# Patient Record
Sex: MALE | Race: WHITE | NOT HISPANIC OR LATINO | Employment: OTHER | URBAN - NONMETROPOLITAN AREA
[De-identification: names, ages, dates, MRNs, and addresses within clinical notes are randomized per-mention and may not be internally consistent; named-entity substitution may affect disease eponyms.]

---

## 2017-02-22 RX ORDER — SUCRALFATE ORAL 1 G/10ML
300 SUSPENSION ORAL DAILY PRN
COMMUNITY
End: 2018-05-24

## 2017-03-15 ENCOUNTER — ANESTHESIA EVENT (OUTPATIENT)
Dept: GASTROENTEROLOGY | Facility: HOSPITAL | Age: 62
End: 2017-03-15

## 2017-03-15 ENCOUNTER — HOSPITAL ENCOUNTER (OUTPATIENT)
Facility: HOSPITAL | Age: 62
Setting detail: HOSPITAL OUTPATIENT SURGERY
Discharge: HOME | End: 2017-03-15
Attending: INTERNAL MEDICINE | Admitting: INTERNAL MEDICINE

## 2017-03-15 ENCOUNTER — ANESTHESIA (OUTPATIENT)
Dept: GASTROENTEROLOGY | Facility: HOSPITAL | Age: 62
End: 2017-03-15

## 2017-03-15 VITALS
OXYGEN SATURATION: 94 % | SYSTOLIC BLOOD PRESSURE: 120 MMHG | RESPIRATION RATE: 19 BRPM | TEMPERATURE: 98.2 F | HEART RATE: 80 BPM | WEIGHT: 262 LBS | BODY MASS INDEX: 37.51 KG/M2 | DIASTOLIC BLOOD PRESSURE: 77 MMHG | HEIGHT: 70 IN

## 2017-03-15 DIAGNOSIS — K63.5 BENIGN COLON POLYP: ICD-10-CM

## 2017-03-15 DIAGNOSIS — K21.9 ACID REFLUX DISEASE WITH ULCER: ICD-10-CM

## 2017-03-15 PROCEDURE — 25010000002 PROPOFOL 10 MG/ML EMULSION: Performed by: NURSE ANESTHETIST, CERTIFIED REGISTERED

## 2017-03-15 PROCEDURE — 88305 TISSUE EXAM BY PATHOLOGIST: CPT | Performed by: INTERNAL MEDICINE

## 2017-03-15 PROCEDURE — 88305 TISSUE EXAM BY PATHOLOGIST: CPT | Performed by: PATHOLOGY

## 2017-03-15 RX ORDER — ONDANSETRON 2 MG/ML
4 INJECTION INTRAMUSCULAR; INTRAVENOUS ONCE AS NEEDED
Status: DISCONTINUED | OUTPATIENT
Start: 2017-03-15 | End: 2017-03-15 | Stop reason: HOSPADM

## 2017-03-15 RX ORDER — PROMETHAZINE HYDROCHLORIDE 25 MG/ML
12.5 INJECTION, SOLUTION INTRAMUSCULAR; INTRAVENOUS ONCE AS NEEDED
Status: DISCONTINUED | OUTPATIENT
Start: 2017-03-15 | End: 2017-03-15 | Stop reason: HOSPADM

## 2017-03-15 RX ORDER — DEXAMETHASONE SODIUM PHOSPHATE 4 MG/ML
8 INJECTION, SOLUTION INTRA-ARTICULAR; INTRALESIONAL; INTRAMUSCULAR; INTRAVENOUS; SOFT TISSUE ONCE AS NEEDED
Status: DISCONTINUED | OUTPATIENT
Start: 2017-03-15 | End: 2017-03-15 | Stop reason: HOSPADM

## 2017-03-15 RX ORDER — PROMETHAZINE HYDROCHLORIDE 25 MG/1
25 SUPPOSITORY RECTAL ONCE AS NEEDED
Status: DISCONTINUED | OUTPATIENT
Start: 2017-03-15 | End: 2017-03-15 | Stop reason: HOSPADM

## 2017-03-15 RX ORDER — PROMETHAZINE HYDROCHLORIDE 25 MG/1
25 TABLET ORAL ONCE AS NEEDED
Status: DISCONTINUED | OUTPATIENT
Start: 2017-03-15 | End: 2017-03-15 | Stop reason: HOSPADM

## 2017-03-15 RX ORDER — PROPOFOL 10 MG/ML
VIAL (ML) INTRAVENOUS AS NEEDED
Status: DISCONTINUED | OUTPATIENT
Start: 2017-03-15 | End: 2017-03-15 | Stop reason: SURG

## 2017-03-15 RX ORDER — DEXTROSE AND SODIUM CHLORIDE 5; .45 G/100ML; G/100ML
20 INJECTION, SOLUTION INTRAVENOUS CONTINUOUS
Status: DISCONTINUED | OUTPATIENT
Start: 2017-03-15 | End: 2017-03-15 | Stop reason: HOSPADM

## 2017-03-15 RX ADMIN — PROPOFOL 100 MG: 10 INJECTION, EMULSION INTRAVENOUS at 14:20

## 2017-03-15 RX ADMIN — PROPOFOL 30 MG: 10 INJECTION, EMULSION INTRAVENOUS at 14:28

## 2017-03-15 RX ADMIN — PROPOFOL 20 MG: 10 INJECTION, EMULSION INTRAVENOUS at 14:25

## 2017-03-15 RX ADMIN — PROPOFOL 30 MG: 10 INJECTION, EMULSION INTRAVENOUS at 14:23

## 2017-03-15 RX ADMIN — PROPOFOL 20 MG: 10 INJECTION, EMULSION INTRAVENOUS at 14:24

## 2017-03-15 RX ADMIN — PROPOFOL 30 MG: 10 INJECTION, EMULSION INTRAVENOUS at 14:21

## 2017-03-15 RX ADMIN — DEXTROSE AND SODIUM CHLORIDE 20 ML/HR: 5; 450 INJECTION, SOLUTION INTRAVENOUS at 14:12

## 2017-03-15 NOTE — PLAN OF CARE
Problem: Patient Care Overview (Adult)  Goal: Plan of Care Review  Outcome: Ongoing (interventions implemented as appropriate)    03/15/17 1433   Coping/Psychosocial Response Interventions   Plan Of Care Reviewed With patient   Patient Care Overview   Progress no change   Outcome Evaluation   Outcome Summary/Follow up Plan vss         Problem: GI Endoscopy (Adult)  Goal: Signs and Symptoms of Listed Potential Problems Will be Absent or Manageable (GI Endoscopy)  Outcome: Ongoing (interventions implemented as appropriate)    03/15/17 1433   GI Endoscopy   Problems Assessed (GI Endoscopy) all   Problems Present (GI Endoscopy) none

## 2017-03-15 NOTE — H&P
Rosalinda Dawson DO,[facility]  Gastroenterology  Hepatology  Endoscopy  Board Certified in Internal Medicine and gastroenterology  [address], [address]  [place]. [address]  - [phone]   F [phone]     GASTROENTEROLOGY HISTORY AND PHYSICAL  NOTE   ROSALINDA DAWSON DO.         SUBJECTIVE:   3/15/2017    Name: Josh Pérez  DOD: 1955        Chief Complaint:       Subjective : Personal history of colon polyps    Patient is 62 y.o. male presents with desire is for surveillance colonoscopy.  No objective or subjective symptoms.  No family history of colon cancer.      ROS/HISTORY/ CURRENT MEDICATIONS/OBJECTIVE/VS/PE:   Review of Systems:   Review of Systems    History:   History reviewed. No pertinent past medical history.  History reviewed. No pertinent past surgical history.  Family History   Problem Relation Age of Onset   • Hypertension Mother    • COPD Mother    • Diabetes Mother    • Heart disease Mother    • Hypertension Father    • COPD Father    • Diabetes Father    • Heart disease Father      Social History   Substance Use Topics   • Smoking status: Never Smoker   • Smokeless tobacco: None   • Alcohol use No     Prescriptions Prior to Admission   Medication Sig Dispense Refill Last Dose   • guaiFENesin (ROBITUSSIN) 100 MG/5ML syrup Take 200 mg by mouth 2 (Two) Times a Day As Needed for cough.   3/14/2017 at Unknown time   • sucralfate (CARAFATE) 1 GM/10ML suspension Take 300 mg by mouth Daily As Needed.   Past Month at Unknown time     Allergies:  Penicillins    I have reviewed the patients medical history, surgical history and family history in the available medical record system.     Current Medications:     Current Facility-Administered Medications   Medication Dose Route Frequency Provider Last Rate Last Dose   • dexamethasone (DECADRON) injection 8 mg  8 mg Intravenous Once PRN Ralph Torrez CRNA       • dextrose 5 % and sodium chloride 0.45 % infusion  20 mL/hr  Intravenous Continuous Joseph Bustos, DO       • meperidine (DEMEROL) injection 12.5 mg  12.5 mg Intravenous Q5 Min PRN Ralph Torrez CRNA       • ondansetron (ZOFRAN) injection 4 mg  4 mg Intravenous Once PRN Ralph Torrez CRNA       • promethazine (PHENERGAN) injection 12.5 mg  12.5 mg Intravenous Once PRN Ralph Torrez CRNA        Or   • promethazine (PHENERGAN) injection 12.5 mg  12.5 mg Intramuscular Once PRN Ralph Torrez CRNA        Or   • promethazine (PHENERGAN) suppository 25 mg  25 mg Rectal Once PRN Ralph Torrez CRNA        Or   • promethazine (PHENERGAN) tablet 25 mg  25 mg Oral Once PRN Ralph Torrez CRNA           Objective     Physical Exam:   Temp:  [97.7 °F (36.5 °C)] 97.7 °F (36.5 °C)  Heart Rate:  [84] 84  Resp:  [20] 20  BP: (159)/(88) 159/88    Physical Exam:  General Appearance:    Alert, cooperative, in no acute distress   Head:    Normocephalic, without obvious abnormality, atraumatic   Eyes:            Lids and lashes normal, conjunctivae and sclerae normal, no   icterus, no pallor, corneas clear, PERRLA   Ears:    Ears appear intact with no abnormalities noted   Throat:   No oral lesions, no thrush, oral mucosa moist   Neck:   No adenopathy, supple, trachea midline, no thyromegaly, no     carotid bruit, no JVD   Back:     No kyphosis present, no scoliosis present, no skin lesions,       erythema or scars, no tenderness to percussion or                   palpation,   range of motion normal   Lungs:     Clear to auscultation,respirations regular, even and                   unlabored    Heart:    Regular rhythm and normal rate, normal S1 and S2, no            murmur, no gallop, no rub, no click   Breast Exam:    Deferred   Abdomen:     Normal bowel sounds, no masses, no organomegaly, soft        non-tender, non-distended, no guarding, no rebound                 tenderness   Genitalia:    Deferred   Extremities:   Moves all extremities well,  no edema, no cyanosis, no              redness   Pulses:   Pulses palpable and equal bilaterally   Skin:   No bleeding, bruising or rash   Lymph nodes:   No palpable adenopathy   Neurologic:   Cranial nerves 2 - 12 grossly intact, sensation intact, DTR        present and equal bilaterally      Results Review:     No results found for: WBC, HGB, HCT, PLT          No results found for: LIPASE  No results found for: INR       Radiology Review:  Imaging Results (last 72 hours)     ** No results found for the last 72 hours. **           I reviewed the patient's new clinical results.  I reviewed the patient's new imaging results and agree with the interpretation.     ASSESSMENT/PLAN:   ASSESSMENT:   1.  Personal history of colon polyps    PLAN:   1.  Colonoscopy, surveillance    Risk and benefits associated with the procedure are reviewed with the patient.  He wishes to proceed      Joseph Bustos DO  03/15/17  2:08 PM

## 2017-03-15 NOTE — DISCHARGE INSTRUCTIONS
Dr. Joseph Bustos, DO  [address]  [place] [address]  [phone]    Appointment date and time: as needed            Outpatient Instructions for Monitored Anesthesia Care (MAC)    1. You will be released from the hospital in the care of a responsible adult who should remain with you for at least 6 hours.    2. You are at an increased risk for falls following anesthesia. Use care when changing from a lying to a sitting position. Use your assistive devices ( example: cane, walker or family member).    3. You must NOT drive a car, climb high places such as a ladder, or operate equipment such as electric knives,stoves etc...for at least 12 hours. If you are dizzy for longer than 24 hours, notify your doctor.    4. DO NOT drink any alcoholic beverages for at least 24 hours. Anesthesia may impair your judgement.    5. If you smoke, do not smoke alone due to increased risk of burns/fires.    6. DO NOT undertake any legally binding commitment for at least 24 hours. Anesthesia may impair your judgement.

## 2017-03-15 NOTE — NURSING NOTE
Pt voices that he initially agreed to EGD & Colonoscopy during visit with Dr. Audra Pepe.  However, he since had decided to not have the EGD.  Dr. Bustos in to speak with patient & aware of the pt's decision.

## 2017-03-15 NOTE — ANESTHESIA POSTPROCEDURE EVALUATION
Patient: Josh Pérez    Procedure Summary     Date Anesthesia Start Anesthesia Stop Room / Location    03/15/17 9877 3 [facility] ENDOSCOPY 2 / [facility] ENDOSCOPY       Procedure Diagnosis Surgeon Provider    COLONOSCOPY (N/A ) Benign colon polyp; Acid reflux disease with ulcer  (COLON POLYP) Joseph Bustos, DO Ralph Torrez, CRNA          Anesthesia Type: MAC  Last vitals  /88 (03/15/17 1347)    Temp 97.7 °F (36.5 °C) (03/15/17 1347)    Pulse 84 (03/15/17 1347)   Resp 20 (03/15/17 1347)    SpO2 96 % (03/15/17 1347)      Post Anesthesia Care and Evaluation    Patient location during evaluation: bedside  Patient participation: complete - patient participated  Level of consciousness: awake and alert  Pain management: adequate  Airway patency: patent  Anesthetic complications: No anesthetic complications  PONV Status: none  Cardiovascular status: acceptable  Respiratory status: acceptable  Hydration status: acceptable

## 2017-03-15 NOTE — ANESTHESIA PREPROCEDURE EVALUATION
Anesthesia Evaluation        Airway   Mallampati: III  TM distance: >3 FB  no difficulty expected  Dental    (+) upper dentures    Pulmonary - negative pulmonary ROS and normal exam   Cardiovascular - negative cardio ROS and normal exam        Neuro/Psych- negative ROS  GI/Hepatic/Renal/Endo    (+) morbid obesity,     Musculoskeletal (-) negative ROS    Abdominal    Substance History      OB/GYN negative ob/gyn ROS         Other                                    Anesthesia Plan    ASA 2     MAC     intravenous induction   Anesthetic plan and risks discussed with patient.

## 2017-03-17 LAB
LAB AP CASE REPORT: NORMAL
Lab: NORMAL
PATH REPORT.FINAL DX SPEC: NORMAL
PATH REPORT.GROSS SPEC: NORMAL

## 2018-05-09 ENCOUNTER — TRANSCRIBE ORDERS (OUTPATIENT)
Dept: ORTHOPEDIC SURGERY | Facility: CLINIC | Age: 63
End: 2018-05-09

## 2018-05-09 DIAGNOSIS — M25.562 CHRONIC PAIN OF LEFT KNEE: Primary | ICD-10-CM

## 2018-05-09 DIAGNOSIS — G89.29 CHRONIC PAIN OF LEFT KNEE: Primary | ICD-10-CM

## 2018-05-23 DIAGNOSIS — M25.562 LEFT KNEE PAIN, UNSPECIFIED CHRONICITY: Primary | ICD-10-CM

## 2018-05-24 ENCOUNTER — OFFICE VISIT (OUTPATIENT)
Dept: ORTHOPEDIC SURGERY | Facility: CLINIC | Age: 63
End: 2018-05-24

## 2018-05-24 VITALS — WEIGHT: 265.4 LBS | HEIGHT: 70 IN | BODY MASS INDEX: 37.99 KG/M2

## 2018-05-24 DIAGNOSIS — S83.242A TEAR OF MEDIAL MENISCUS OF LEFT KNEE, CURRENT, INITIAL ENCOUNTER: ICD-10-CM

## 2018-05-24 DIAGNOSIS — M25.562 LEFT KNEE PAIN, UNSPECIFIED CHRONICITY: Primary | ICD-10-CM

## 2018-05-24 PROCEDURE — 99203 OFFICE O/P NEW LOW 30 MIN: CPT | Performed by: ORTHOPAEDIC SURGERY

## 2018-05-24 PROCEDURE — 20610 DRAIN/INJ JOINT/BURSA W/O US: CPT | Performed by: ORTHOPAEDIC SURGERY

## 2018-05-24 RX ADMIN — LIDOCAINE HYDROCHLORIDE 2 ML: 10 INJECTION, SOLUTION INFILTRATION; PERINEURAL at 15:44

## 2018-05-24 RX ADMIN — TRIAMCINOLONE ACETONIDE 40 MG: 40 INJECTION, SUSPENSION INTRA-ARTICULAR; INTRAMUSCULAR at 15:44

## 2018-05-24 NOTE — PROGRESS NOTES
Josh Pérez is a 63 y.o. male   Primary provider:  Antoinette Pepe MD       Chief Complaint   Patient presents with   • Left Knee - Pain       HISTORY OF PRESENT ILLNESS: Patient being seen for left knee pain. MRI done at [facility] 4/30/2018. Patient states he has difficulty walking on left leg. Pain increases at night, disrupting sleep.   Pain on the inside of left knee.  Did do some PT but it made his pain worse.  Occasional sharp stabbing pain that has been worsening for about 7-8 months.  No specific injury  Pain with pivot and twist  No numbness or tingling that is new  Does have swelling in both knees.  Pain with deep knee bends.    Pain   The current episode started more than 1 month ago (8 months). Associated symptoms include joint swelling and numbness. Associated symptoms comments: Aching, burning, swelling. The symptoms are aggravated by standing and walking. He has tried rest for the symptoms.        CONCURRENT MEDICAL HISTORY:    History reviewed. No pertinent past medical history.    Allergies   Allergen Reactions   • Penicillins Rash         Current Outpatient Prescriptions:   •  guaiFENesin (ROBITUSSIN) 100 MG/5ML syrup, Take 200 mg by mouth 2 (Two) Times a Day As Needed for cough., Disp: , Rfl:   •  Naproxen Sodium (ALEVE PO), Take  by mouth., Disp: , Rfl:     Past Surgical History:   Procedure Laterality Date   • COLONOSCOPY N/A 3/15/2017    Procedure: COLONOSCOPY;  Surgeon: Joseph Bustos DO;  Location: [facility] ENDOSCOPY;  Service:        Family History   Problem Relation Age of Onset   • Hypertension Mother    • COPD Mother    • Diabetes Mother    • Heart disease Mother    • Hypertension Father    • COPD Father    • Diabetes Father    • Heart disease Father        Social History     Social History   • Marital status:      Spouse name: N/A   • Number of children: N/A   • Years of education: N/A     Occupational History   • Not on file.     Social History Main Topics   •  "Smoking status: Never Smoker   • Smokeless tobacco: Never Used   • Alcohol use No   • Drug use: No   • Sexual activity: Not on file     Other Topics Concern   • Not on file     Social History Narrative   • No narrative on file        Review of Systems   HENT: Positive for postnasal drip.    Genitourinary: Positive for difficulty urinating.   Musculoskeletal: Positive for joint swelling.        Stiffness   Neurological: Positive for numbness.   All other systems reviewed and are negative.      PHYSICAL EXAMINATION:       Ht 177.8 cm (70\")   Wt 120 kg (265 lb 6.4 oz)   BMI 38.08 kg/m²     Physical Exam   Constitutional: He is oriented to person, place, and time. He appears well-developed and well-nourished.   Musculoskeletal:        Left knee: He exhibits no effusion.   Neurological: He is alert and oriented to person, place, and time.   Psychiatric: He has a normal mood and affect. His behavior is normal. Judgment and thought content normal.       GAIT:     [x]  Normal  []  Antalgic    Assistive device: [x]  None  []  Walker     []  Crutches  []  Cane     []  Wheelchair  []  Stretcher    Right Knee Exam     Tenderness   The patient is experiencing no tenderness.         Range of Motion   The patient has normal right knee ROM.    Muscle Strength     The patient has normal right knee strength.    Tests   Drawer:       Anterior - negative      Varus: negative  Valgus: negative    Other   Erythema: absent  Sensation: normal  Pulse: present  Swelling: none      Left Knee Exam     Tenderness   The patient is experiencing tenderness in the medial joint line and medial retinaculum.    Muscle Strength     The patient has normal left knee strength.    Tests   Drawer:       Anterior - negative       Varus: negative  Valgus: negative    Other   Erythema: absent  Sensation: normal  Pulse: present  Swelling: none  Effusion: no effusion present              Large Joint Arthrocentesis  Date/Time: 5/24/2018 3:44 PM  Consent given " by: patient  Site marked: site marked  Timeout: Immediately prior to procedure a time out was called to verify the correct patient, procedure, equipment, support staff and site/side marked as required   Supporting Documentation  Indications: joint swelling   Procedure Details  Location: knee - L knee  Preparation: Patient was prepped and draped in the usual sterile fashion  Needle size: 22 G  Approach: anteromedial  Medications administered: 40 mg triamcinolone acetonide 40 MG/ML; 2 mL lidocaine 1 %              MRI Left Knee [facility]  4/30/2018            ASSESSMENT:    Diagnoses and all orders for this visit:    Left knee pain, unspecified chronicity  -     Large Joint Arthrocentesis    Tear of medial meniscus of left knee, current, initial encounter    Other orders  -     Naproxen Sodium (ALEVE PO); Take  by mouth.          PLAN    Patient did not bring xrays today.  We discussed injection today.  Will see how he responds to injection  Possible surgery depending on how his sympoms change with the injection.  Plan AP bilateral standing with laterals of each knee.  Activity as tolerated.      Patient's Body mass index is 38.08 kg/m². BMI is above normal parameters. Recommendations include: exercise counseling and nutrition counseling.    Return in about 6 weeks (around 7/5/2018) for Recheck with repeat xrays.      Joseph Eddy MD

## 2018-05-27 RX ORDER — TRIAMCINOLONE ACETONIDE 40 MG/ML
40 INJECTION, SUSPENSION INTRA-ARTICULAR; INTRAMUSCULAR
Status: COMPLETED | OUTPATIENT
Start: 2018-05-24 | End: 2018-05-24

## 2018-05-27 RX ORDER — LIDOCAINE HYDROCHLORIDE 10 MG/ML
2 INJECTION, SOLUTION INFILTRATION; PERINEURAL
Status: COMPLETED | OUTPATIENT
Start: 2018-05-24 | End: 2018-05-24

## 2018-07-11 DIAGNOSIS — M25.562 PAIN IN BOTH KNEES, UNSPECIFIED CHRONICITY: Primary | ICD-10-CM

## 2018-07-11 DIAGNOSIS — M25.561 PAIN IN BOTH KNEES, UNSPECIFIED CHRONICITY: Primary | ICD-10-CM

## 2018-07-12 ENCOUNTER — OFFICE VISIT (OUTPATIENT)
Dept: ORTHOPEDIC SURGERY | Facility: CLINIC | Age: 63
End: 2018-07-12

## 2018-07-12 VITALS — WEIGHT: 256 LBS | HEIGHT: 70 IN | BODY MASS INDEX: 36.65 KG/M2

## 2018-07-12 DIAGNOSIS — S83.242A TEAR OF MEDIAL MENISCUS OF LEFT KNEE, CURRENT, INITIAL ENCOUNTER: ICD-10-CM

## 2018-07-12 DIAGNOSIS — M79.89 LEFT LEG SWELLING: ICD-10-CM

## 2018-07-12 DIAGNOSIS — M25.562 LEFT KNEE PAIN, UNSPECIFIED CHRONICITY: Primary | ICD-10-CM

## 2018-07-12 PROCEDURE — 99213 OFFICE O/P EST LOW 20 MIN: CPT | Performed by: ORTHOPAEDIC SURGERY

## 2018-07-12 NOTE — PROGRESS NOTES
"Josh Pérez is a 63 y.o. male returns for     Chief Complaint   Patient presents with   • Left Knee - Follow-up       HISTORY OF PRESENT ILLNESS: Patient being seen for left knee follow up. X-rays done today. Patient has noticed decrease in pain in left knee. He also states he is experiencing pain in right hip.   He is doing some better.  He still has pain in the medial aspect of his left knee.  He is also hurting on the lateral aspect of his right hip.  Mostly it's dull aches but he does have occasional sharp pains.       CONCURRENT MEDICAL HISTORY:    History reviewed. No pertinent past medical history.    Allergies   Allergen Reactions   • Penicillins Rash         Current Outpatient Prescriptions:   •  guaiFENesin (ROBITUSSIN) 100 MG/5ML syrup, Take 200 mg by mouth 2 (Two) Times a Day As Needed for cough., Disp: , Rfl:   •  apixaban (ELIQUIS) 5 MG tablet tablet, Take 2 tablets by mouth Every 12 (Twelve) Hours for 7 days., Disp: 28 tablet, Rfl: 0  •  apixaban (ELIQUIS) 5 MG tablet tablet, Take 1 tablet by mouth Every 12 (Twelve) Hours for 35 days., Disp: 70 tablet, Rfl: 0    Past Surgical History:   Procedure Laterality Date   • COLONOSCOPY N/A 3/15/2017    Procedure: COLONOSCOPY;  Surgeon: Joseph Bustos DO;  Location: [facility] ENDOSCOPY;  Service:        ROS  No fevers or chills.  No chest pain or shortness of air.  No GI or  disturbances.    PHYSICAL EXAMINATION:       Ht 177.8 cm (70\")   Wt 116 kg (256 lb)   BMI 36.73 kg/m²     Physical Exam   Constitutional: He is oriented to person, place, and time. He appears well-developed and well-nourished.   Musculoskeletal:        Left knee: He exhibits no effusion.   Neurological: He is alert and oriented to person, place, and time.   Psychiatric: He has a normal mood and affect. His behavior is normal. Judgment and thought content normal.       GAIT:     [x]  Normal  []  Antalgic    Assistive device: [x]  None  []  Walker     []  Crutches  []  Cane     []  " Wheelchair  []  Stretcher    Left Knee Exam     Tenderness   The patient is experiencing tenderness in the medial joint line and medial retinaculum.    Muscle Strength     The patient has normal left knee strength.    Tests   Drawer:       Anterior - negative       Varus: negative  Valgus: negative    Other   Erythema: absent  Sensation: normal  Pulse: present  Swelling: none  Effusion: no effusion present    Comments:  He has mild diffuse swelling in the left lower extremity and he is tender mildly in the calf and mildly posterior to his knee as well.              Xr Knee Bilateral Ap Standing    Result Date: 7/13/2018  Narrative: Ordering Provider:  Joseph Eddy MD Ordering Diagnosis/Indication:  Pain in both knees, unspecified chronicity Procedure:  XR KNEE BILATERAL AP STANDING Exam Date:  7/12/18 RELEVANT PRIOR IMAGES:  None COMPARISON:  Not applicable, no relevant images available.     Impression:   AP bilateral standing with lateral of each knee shows acceptable position and alignment of both knees with mild varus positioning.  There is maintenance of joint space in both knees.  No evidence of acute bony abnormality is noted.  No significant patellofemoral joint arthritic changes noted. Joseph Eddy MD 7/12/18     Xr Knee 1 Or 2 View Bilateral    Result Date: 7/13/2018  Narrative: Ordering Provider:  Joseph Eddy MD Ordering Diagnosis/Indication:  Pain in both knees, unspecified chronicity Procedure:  XR KNEE 1 OR 2 VW BILATERAL Exam Date:  7/12/18 RELEVANT PRIOR IMAGES:  None COMPARISON:  Not applicable, no relevant images available.     Impression:  AP bilateral standing with lateral of each knee shows acceptable position and alignment of both knees with mild varus positioning.  There is maintenance of joint space in both knees.  No evidence of acute bony abnormality is noted.  No significant patellofemoral joint arthritic changes noted. Joseph Eddy MD 7/12/18     Us  Venous Doppler Lower Extremity Left (duplex)    Result Date: 7/13/2018  Narrative: EXAM DESCRIPTION: US VENOUS DOPPLER LOWER EXTREMITY LEFT (DUPLEX) PROCEDURE: Left Lower Extremity Venous Duplex COMPARISON: None HISTORY: Left lower extremity pain and swelling FINDINGS: Realtime grayscale and color-flow imaging were performed of the left lower extremity. The left common femoral vein, profunda femoral vein, and femoral vein have normal venous flow on duplex and color Doppler. The veins augment well compress normally, and appear to be patent with no filling defects visible on color Doppler or grayscale imaging. There is nonocclusive peripheral thrombus seen within the popliteal vein. This may be chronic. No central or occlusive thrombus identified. The visualized infrapopliteal veins also appear patent with no evidence of thrombus. The greater saphenous vein is patent.     Impression: There is nonocclusive peripheral thrombus seen within the popliteal vein. Findings reported to Ana Maria GROSSMAN in the office of Dr. Joseph Eddy 1:30 PM CST 7/13/2018. Electronically signed by:  Speedy Carlton MD  7/13/2018 1:36 PM CDT Workstation: [id number]            ASSESSMENT:    Diagnoses and all orders for this visit:    Left knee pain, unspecified chronicity  -     US Venous Doppler Lower Extremity Left (duplex); Future    Tear of medial meniscus of left knee, current, initial encounter  -     US Venous Doppler Lower Extremity Left (duplex); Future    Left leg swelling  -     US Venous Doppler Lower Extremity Left (duplex); Future    Other orders  -     apixaban (ELIQUIS) 5 MG tablet tablet; Take 2 tablets by mouth Every 12 (Twelve) Hours for 7 days.  -     apixaban (ELIQUIS) 5 MG tablet tablet; Take 1 tablet by mouth Every 12 (Twelve) Hours for 35 days.          PLAN    Plan is for him to proceed with a ultrasound of the left leg to assess for the possibility of a DVT.  Otherwise we discussed the possibility of a repeat injection in the  left knee.  We also discussed possibility of a trochanteric injection into the right hip.  He was encouraged using a cane in his right hand to offset the weight on his left leg to help localization without a limp.    Patient's Body mass index is 36.73 kg/m². BMI is above normal parameters. Recommendations include: exercise counseling and nutrition counseling.      Report was reviewed and DVT noted in popliteal vein.  Will start Eliquis.  Patient was notified at 1925 on Friday night.  He will  the medication in the morning.  We will also work on getting him set up to see vascular for management and screening risk for further DVT.    Return in about 4 weeks (around 8/9/2018).    Joseph Eddy MD

## 2018-07-13 ENCOUNTER — HOSPITAL ENCOUNTER (OUTPATIENT)
Dept: ULTRASOUND IMAGING | Facility: HOSPITAL | Age: 63
Discharge: HOME | End: 2018-07-13
Admitting: ORTHOPAEDIC SURGERY

## 2018-07-13 ENCOUNTER — TELEPHONE (OUTPATIENT)
Dept: ORTHOPEDIC SURGERY | Facility: CLINIC | Age: 63
End: 2018-07-13

## 2018-07-13 DIAGNOSIS — M79.89 LEFT LEG SWELLING: ICD-10-CM

## 2018-07-13 DIAGNOSIS — S83.242A TEAR OF MEDIAL MENISCUS OF LEFT KNEE, CURRENT, INITIAL ENCOUNTER: ICD-10-CM

## 2018-07-13 DIAGNOSIS — M25.562 LEFT KNEE PAIN, UNSPECIFIED CHRONICITY: ICD-10-CM

## 2018-07-13 PROCEDURE — 93971 EXTREMITY STUDY: CPT

## 2018-07-13 NOTE — TELEPHONE ENCOUNTER
JUST HAD HIS ULTRA SOUND DONE, THEY SEEN  SOMETHING ON IT AND TOLD HIM TO CALL THE OFFICE AND HAVE YOU LOOK AT IT.   DD

## 2018-08-14 ENCOUNTER — OFFICE VISIT (OUTPATIENT)
Dept: CARDIAC SURGERY | Facility: CLINIC | Age: 63
End: 2018-08-14

## 2018-08-14 VITALS
BODY MASS INDEX: 36.54 KG/M2 | HEIGHT: 71 IN | HEART RATE: 89 BPM | OXYGEN SATURATION: 99 % | DIASTOLIC BLOOD PRESSURE: 80 MMHG | WEIGHT: 261 LBS | SYSTOLIC BLOOD PRESSURE: 140 MMHG

## 2018-08-14 DIAGNOSIS — I82.432 ACUTE DEEP VEIN THROMBOSIS (DVT) OF POPLITEAL VEIN OF LEFT LOWER EXTREMITY (HCC): ICD-10-CM

## 2018-08-14 DIAGNOSIS — M79.89 LEFT LEG SWELLING: Primary | ICD-10-CM

## 2018-08-14 DIAGNOSIS — Z79.01 CURRENT USE OF LONG TERM ANTICOAGULATION: ICD-10-CM

## 2018-08-14 DIAGNOSIS — R00.2 HEART PALPITATIONS: ICD-10-CM

## 2018-08-14 DIAGNOSIS — R06.02 SHORTNESS OF BREATH: ICD-10-CM

## 2018-08-14 PROBLEM — I82.439 ACUTE DEEP VEIN THROMBOSIS (DVT) OF POPLITEAL VEIN: Status: ACTIVE | Noted: 2018-08-14

## 2018-08-14 PROCEDURE — 99204 OFFICE O/P NEW MOD 45 MIN: CPT | Performed by: NURSE PRACTITIONER

## 2018-08-14 NOTE — PATIENT INSTRUCTIONS
Probable Acute DVT LEFT lower extremity-RESOLVED  Long term anticoagulation: Eliquis x 3 mos   Compression stockings for swelling/discomfort  If you should experience any additional leg pain with swelling or sudden onset of shortness of breath notify heart and vascular center immediately for evaluation.    Obtain Partial-Hypercoagulable Panel  Office to call abnormal results (approx 2 weeks)  Return 3mos- Re-Eval    Palpitations/SOA  Strong family History Heart disease  -Obtain ECHO  -Cardiology Referral for risk factor modification evaluation

## 2018-08-22 NOTE — PROGRESS NOTES
Subjective   Patient ID: Josh Pérez is a 63 y.o. male is being seen for consultation today at the request of Joseph Eddy, *    Chief Complaint:    Chief Complaint   Patient presents with   • Leg Swelling       The following portions of the patient's history were reviewed and updated as appropriate: allergies, current medications, past family history, past medical history, past social history, past surgical history and problem list.  Recent images independently reviewed.  Available laboratory values reviewed.    PCP:  Antoinette Pepe MD    63 y.o. male with complaints of LLE Swelling x 8-10 months, conservative treatment, diuretics. Evaluated by orthopedics for LEFT knee pain. Further workup demonstrated +thrombus LEFT popliteal vein. Anticoagulation was initiated Eliquis acute dosing. Referred for vascular follow up. Strong family history heart disease (death ages 62,66). Reports dyspnea on exertion, occasional palpitation.  No TIA stroke amaurosis.  No MI claudication. No other associated signs, symptoms or modifying factors.    7/13/18: LLE Venous Duplex (BHM): +Nonocclusive thrombus popliteal vein  8/14/18: LLE Venous Duplex (HVC): Negative DVT      No past medical history on file.  Past Surgical History:   Procedure Laterality Date   • COLONOSCOPY N/A 3/15/2017    Procedure: COLONOSCOPY;  Surgeon: Joseph Bustos DO;  Location: [facility] ENDOSCOPY;  Service:        ALLERGIES:   Penicillins    MEDICATIONS:  Current outpatient and discharge medications have been reconciled for the patient.  Reviewed by: HELEN Meza     Current Outpatient Prescriptions:   •  apixaban (ELIQUIS) 5 MG tablet tablet, Take 1 tablet by mouth Every 12 (Twelve) Hours., Disp: 60 tablet, Rfl: 1  •  Sildenafil Citrate (VIAGRA PO), Take  by mouth., Disp: , Rfl:   •  guaiFENesin (ROBITUSSIN) 100 MG/5ML syrup, Take 200 mg by mouth 2 (Two) Times a Day As Needed for cough., Disp: , Rfl:     Review of Systems    Constitution: Negative for weakness.   HENT: Negative for nosebleeds.    Eyes: Negative for visual disturbance.   Cardiovascular: Positive for leg swelling (L<R) and palpitations. Negative for claudication and cyanosis.   Respiratory: Positive for shortness of breath. Negative for hemoptysis.    Hematologic/Lymphatic: Negative for bleeding problem. Does not bruise/bleed easily.   Skin: Negative for color change and nail changes.   Musculoskeletal: Positive for joint pain. Negative for muscle weakness.   Gastrointestinal: Negative for dysphagia, hematemesis and melena.   Genitourinary: Negative for hematuria.   Neurological: Negative for dizziness, focal weakness, light-headedness, loss of balance, numbness and paresthesias.   Psychiatric/Behavioral: Negative for altered mental status.   All other systems reviewed and are negative.       Objective       Vitals:    08/14/18 1406   BP: 140/80   Pulse:    SpO2:       Body mass index is 36.4 kg/m².  Physical Exam   Constitutional: He is oriented to person, place, and time. He appears well-developed.   HENT:   Head: Normocephalic.   Eyes: EOM are normal.   Neck: Neck supple. Carotid bruit is not present.   Cardiovascular: Normal rate, regular rhythm and normal heart sounds.    Pulses:       Dorsalis pedis pulses are 2+ on the right side, and 2+ on the left side.        Posterior tibial pulses are 2+ on the right side, and 2+ on the left side.   Pulmonary/Chest: Effort normal and breath sounds normal.   Abdominal: Soft. Bowel sounds are normal.   Musculoskeletal: Normal range of motion. He exhibits edema (mild LLE).   Gait normal   Neurological: He is alert and oriented to person, place, and time. No cranial nerve deficit.   Skin: Skin is warm and dry. Capillary refill takes less than 2 seconds.   Psychiatric: He has a normal mood and affect. Thought content normal.   Vitals reviewed.      Assessment/Plan   Independent Review of Radiographic Studies:  LE VENOUS  DUPLEX  Detailed discussion regarding risks, benefits, and treatment plan. Images independently reviewed. Patient understands, agrees, and wishes to proceed with plan.     1. Left leg swelling  Probable Acute DVT LEFT lower extremity-RESOLVED  Compression stockings for swelling/discomfort  If you should experience any additional leg pain with swelling or sudden onset of shortness of breath notify heart and vascular center immediately for evaluation.    Obtain Partial-Hypercoagulable Panel  Office to call abnormal results (approx 2 weeks)  Return 3mos- Re-Eval  - Duplex Venous Lower Extremity - Left CAR; Future    2. Acute deep vein thrombosis (DVT) of popliteal vein of left lower extremity (CMS/HCC)  Long term anticoagulation: Eliquis x 3 mos   - apixaban (ELIQUIS) 5 MG tablet tablet; Take 1 tablet by mouth Every 12 (Twelve) Hours.  Dispense: 60 tablet; Refill: 1  - Factor II, DNA Analysis; Future  - Factor 5 [place]; Future  - Homocysteine, serum; Future    3. Current use of long term anticoagulation  Anticoagulation teaching/discussion for 30 minutes of direct face-face interaction with the patient during this encounter and over half of that time was spent on counseling and coordination of care.  We discussed in depth the importance of medication adherence, signs/symptoms of bleeding, and management of anticoagulation for procedures. I also educated the patient about assistance programs available for cost reduction of the medication prescribed.      4. Heart palpitations  Palpitations/SOA  Strong family History Heart disease  -Obtain ECHO  -Cardiology Referral for risk factor modification evaluation  - Adult Transthoracic Echo Limited W/ Cont if Necessary Per Protocol; Future    5. Shortness of breath  - Adult Transthoracic Echo Limited W/ Cont if Necessary Per Protocol; Future            This document has been electronically signed by HELEN Meza on August 22, 2018 5:08 PM

## 2018-08-23 ENCOUNTER — LAB (OUTPATIENT)
Dept: LAB | Facility: HOSPITAL | Age: 63
End: 2018-08-23

## 2018-08-23 DIAGNOSIS — I82.432 ACUTE DEEP VEIN THROMBOSIS (DVT) OF POPLITEAL VEIN OF LEFT LOWER EXTREMITY (HCC): ICD-10-CM

## 2018-08-23 PROCEDURE — 83090 ASSAY OF HOMOCYSTEINE: CPT

## 2018-08-23 PROCEDURE — 81240 F2 GENE: CPT

## 2018-08-23 PROCEDURE — 36415 COLL VENOUS BLD VENIPUNCTURE: CPT

## 2018-08-23 PROCEDURE — 81241 F5 GENE: CPT

## 2018-08-24 LAB — HCYS SERPL-SCNC: 12 UMOL/L (ref 0–15)

## 2018-08-27 LAB
F2 GENE MUT ANL BLD/T: NORMAL
F5 GENE MUT ANL BLD/T: NORMAL

## 2018-10-22 ENCOUNTER — OFFICE VISIT (OUTPATIENT)
Dept: CARDIOLOGY | Facility: CLINIC | Age: 63
End: 2018-10-22

## 2018-10-22 VITALS
HEART RATE: 90 BPM | OXYGEN SATURATION: 95 % | DIASTOLIC BLOOD PRESSURE: 80 MMHG | BODY MASS INDEX: 36.82 KG/M2 | WEIGHT: 263 LBS | SYSTOLIC BLOOD PRESSURE: 140 MMHG | HEIGHT: 71 IN

## 2018-10-22 DIAGNOSIS — I10 BENIGN ESSENTIAL HTN: ICD-10-CM

## 2018-10-22 DIAGNOSIS — R06.02 SHORTNESS OF BREATH: ICD-10-CM

## 2018-10-22 DIAGNOSIS — R00.2 HEART PALPITATIONS: ICD-10-CM

## 2018-10-22 DIAGNOSIS — I73.9 CLAUDICATION (HCC): ICD-10-CM

## 2018-10-22 DIAGNOSIS — R07.2 PRECORDIAL PAIN: Primary | ICD-10-CM

## 2018-10-22 PROCEDURE — 99214 OFFICE O/P EST MOD 30 MIN: CPT | Performed by: NURSE PRACTITIONER

## 2018-10-22 PROCEDURE — 93000 ELECTROCARDIOGRAM COMPLETE: CPT | Performed by: INTERNAL MEDICINE

## 2018-10-22 RX ORDER — HYDROCHLOROTHIAZIDE 12.5 MG/1
12.5 TABLET ORAL DAILY
COMMUNITY
End: 2018-10-22 | Stop reason: SINTOL

## 2018-10-22 NOTE — PROGRESS NOTES
"CC: dyspnea    Chest Pain    This is a recurrent problem. The current episode started more than 1 month ago. The onset quality is undetermined. The problem occurs intermittently. The problem has been unchanged. The pain is present in the lateral region. The pain is mild. The quality of the pain is described as sharp. The pain does not radiate. Pertinent negatives include no dizziness, exertional chest pressure, irregular heartbeat, palpitations, shortness of breath or syncope.   Shortness of Breath   This is a recurrent problem. The current episode started more than 1 month ago. The problem occurs intermittently. The problem has been unchanged. Pertinent negatives include no chest pain, leg swelling or syncope. Exacerbated by: exertion.     Mr. Pérez is a 63 year old male referred to Cardiology from HELEN Hurst who evaluated him for DVT. He has had some vague symptoms of SOA, claudication, and lateral wall stabbing sensation. He has history of DVT in July and has been on Eliquis. He did not have a CTA chest or VQ scan. He has not missed dosages of his AC. Concerning his dyspnea, he has no problems completing any activity. He states his legs feel heavy and the left calf still hurts and feels \"pulled\". Venous duplex repeated one month post DVT on Eliquis was without DVT or vein issue. He does have a meniscus tear in the left knee.   Risk factor for CAD are unknown. There are not records or labs on file. He had a murmur as a child and had a TST in his 20s. EKG is abnormal with incomplete RBBB.   He has a family history of heart attack and death but both of his parents smoked, were extremely obese, and had other risk factors including HTN and heart failure. They  in their late 60s.   Mr. Pérez has a slightly elevated BP this visit and last visit with Danielle. He has been on HCTZ 12.5mg in the past, but has not had for many weeks. He did not feel that it did anything.        Cardiac Risk Factors:  The ASCVD Risk " score (Shannon MIRANDA Jr., et al., 2013) failed to calculate for the following reasons:    Cannot find a previous HDL lab    Cannot find a previous total cholesterol lab    Family History   Problem Relation Age of Onset   • Hypertension Mother    • COPD Mother    • Diabetes Mother    • Heart disease Mother    • Heart failure Mother    • Hypertension Father    • COPD Father    • Diabetes Father    • Heart disease Father    • Heart attack Brother        Past Medical History:   Diagnosis Date   • Benign essential HTN 10/22/2018     Past Surgical History:   Procedure Laterality Date   • COLONOSCOPY N/A 3/15/2017    Procedure: COLONOSCOPY;  Surgeon: Joseph Bustos DO;  Location: [facility] ENDOSCOPY;  Service:      Social History     Social History   • Marital status:      Social History Main Topics   • Smoking status: Never Smoker   • Smokeless tobacco: Never Used   • Alcohol use No   • Drug use: No     Other Topics Concern   • Not on file     ALLERGIES:  Allergies   Allergen Reactions   • Penicillins Rash         Review of Systems   Cardiovascular: Negative for chest pain, irregular heartbeat, leg swelling, palpitations and syncope.   Respiratory: Negative for shortness of breath.    Neurological: Negative for dizziness.       Current Outpatient Prescriptions   Medication Sig Dispense Refill   • apixaban (ELIQUIS) 5 MG tablet tablet Take 1 tablet by mouth Every 12 (Twelve) Hours. 60 tablet 1   • guaiFENesin (ROBITUSSIN) 100 MG/5ML syrup Take 200 mg by mouth 2 (Two) Times a Day As Needed for cough.     • Sildenafil Citrate (VIAGRA PO) Take  by mouth.       No current facility-administered medications for this visit.        OBJECTIVE:    Physical Exam:   Physical Exam   Constitutional: He is oriented to person, place, and time. He appears well-developed and well-nourished. No distress.   HENT:   Head: Normocephalic and atraumatic.   Neck: Normal range of motion. No JVD present.   Cardiovascular: Normal rate, regular  "rhythm, S1 normal, S2 normal, normal heart sounds and intact distal pulses.    No murmur heard.  Pulmonary/Chest: Effort normal and breath sounds normal. No respiratory distress. He has no wheezes. He has no rales.   Abdominal: Soft. Bowel sounds are normal.   Musculoskeletal: Normal range of motion. He exhibits edema (trace to LLE).   Neurological: He is alert and oriented to person, place, and time.   Skin: Skin is warm and dry. No erythema.   Psychiatric: He has a normal mood and affect. His behavior is normal. Judgment and thought content normal.        Vitals:    10/22/18 1020   BP: 140/80   BP Location: Left arm   Patient Position: Sitting   Cuff Size: Adult   Pulse: 90   SpO2: 95%   Weight: 119 kg (263 lb)   Height: 180.3 cm (71\")       DATA REVIEWED:   Results for orders placed during the hospital encounter of 10/02/18   Adult Transthoracic Echo Complete W/ Cont if Necessary Per Protocol    Narrative · Left ventricular systolic function is normal. Estimated EF = 58%.  · Left ventricular diastolic dysfunction (grade I) consistent with   impaired relaxation.  · Left atrial cavity size is borderline dilated.           Labs: BMP, CBC, LIPID, TSH  No results found for: GLUCOSE, CALCIUM, NA, K, CO2, CL, BUN, CREATININE, EGFRIFAFRI, EGFRIFNONA, BCR, ANIONGAP  No results found for: WBC, HGB, HCT, MCV, PLT  No results found for: CHOL  No results found for: TRIG  No results found for: HDL  No components found for: LDLCALC  No results found for: LDL  No results found for: HDLLDLRATIO  No components found for: CHOLHDL  No results found for: TSH  No results found for: PROBNP  EKG:     TTE: 10/2/18  Interpretation Summary     · Left ventricular systolic function is normal. Estimated EF = 58%.  · Left ventricular diastolic dysfunction (grade I) consistent with impaired relaxation.  · Left atrial cavity size is borderline dilated.       Stress tests:     LHC:      The following portions of the patient's history were " reviewed and updated as appropriate: allergies, current medications, past family history, past medical history, past social history, past surgical history and problem list.  Old records reviewed and pertinent information is included in the above objective data.     ASSESSMENT/PLAN:  Records from Antoinette Torrez's office.      Diagnosis Plan   1. Precordial pain  Chest pain syndrome. Pain characteristic: atypical angina   Patient is Low Risk. Unable to calculate CRA, but pretest CAD intermediate secondary to age.       Ischemic evaluation:requested.  The patient is able to exercise. EKG is Abnormal: incomplete RBBB. Would like to not use TST alone, but also nuclear images.    Risks/Benefits discussed    Ischemia evaluation with exercise cardioltye with Lexiscan medication if needed.   Even with knee discomfort he believes he can exercise to target HR. Curious about his blood pressure response with exercise. I suspect it is higher with activity than he realizes leading to early grade I DD and high LA pressure on echo.      Stress Test With Myocardial Perfusion Two Day       2. Claudication (CMS/HCC)  Doppler Ankle Brachial Index Single Level CAR prior to next appt with Danielle. He is still complaining of bilateral leg claudication type symptoms and slight swelling in left. At time he feels that his legs are heavy and tingling.   Will rule out PVD.   Curious about blood sugars- will get labs from Antoinette Torrez's office. Possible neuropathy.       3 Shortness of breath  Worse with exertion. He attributes it to weight gain and not being in the best shape any more.   At this time I have no reason with his story to suspect CAD, CHF, or primary lung component. He is able to complete all activities of daily living and work 2 jobs.   Echo completed and reviewed.      I do not feel, nor does he, that his shortness of breath has worsened or changed in the past 2 months. I do not suspect PE as he had not had lapse in anticoagulation.  Will  obtain CTA or VQ should symptoms persist.        4. Benign essential HTN  HTN, essential.   BP noted to be mildly elevated today in office.    LVH: absent.  LVEF:Normal.   Diastolic function:Abnormal: Grade I.     DASH; he has begun to lower the amount of sodium he takes in    There are not any basic labs on file as he sees PCP at outside facitilty. Will call for those records prior to BP medication initiation. Also, will like to see exercise portion of stress test prior to initiating BP medications.   He has been on 12.5mg HCTZ prior without any increase in diuresis or improvement in BP.     I asked him to monitor his BP at times after exertion and see how elevated it is.          Follow up in 1 month following testing.

## 2018-11-08 ENCOUNTER — APPOINTMENT (OUTPATIENT)
Dept: NUCLEAR MEDICINE | Facility: HOSPITAL | Age: 63
End: 2018-11-08

## 2018-11-09 ENCOUNTER — APPOINTMENT (OUTPATIENT)
Dept: NUCLEAR MEDICINE | Facility: HOSPITAL | Age: 63
End: 2018-11-09

## 2018-11-09 ENCOUNTER — APPOINTMENT (OUTPATIENT)
Dept: CARDIOLOGY | Facility: HOSPITAL | Age: 63
End: 2018-11-09

## 2019-02-05 ENCOUNTER — OFFICE VISIT (OUTPATIENT)
Dept: FAMILY MEDICINE CLINIC | Facility: CLINIC | Age: 64
End: 2019-02-05

## 2019-02-05 VITALS
BODY MASS INDEX: 37.15 KG/M2 | WEIGHT: 265.4 LBS | OXYGEN SATURATION: 98 % | RESPIRATION RATE: 18 BRPM | HEIGHT: 71 IN | TEMPERATURE: 98.1 F

## 2019-02-05 DIAGNOSIS — R51.9 NONINTRACTABLE EPISODIC HEADACHE, UNSPECIFIED HEADACHE TYPE: ICD-10-CM

## 2019-02-05 DIAGNOSIS — R42 VERTIGO: Primary | ICD-10-CM

## 2019-02-05 PROCEDURE — 99213 OFFICE O/P EST LOW 20 MIN: CPT | Performed by: NURSE PRACTITIONER

## 2019-02-05 RX ORDER — MECLIZINE HCL 12.5 MG/1
12.5 TABLET ORAL 3 TIMES DAILY PRN
Qty: 30 TABLET | Refills: 1 | Status: SHIPPED | OUTPATIENT
Start: 2019-02-05 | End: 2020-06-26

## 2019-02-05 NOTE — PROGRESS NOTES
Subjective   Josh Pérez is a 63 y.o. male.     Mr Pérez is a 63-year-old male presents today with vertigo times 4 days and headache times 2 weeks. Patient reports over the last 4 days he's had episodic bouts of vertigo/dizziness. Episodes happen at random, with no causative factors. He can be sitting, standing, walking and episodes will happen. There is nothing he can do to make the episode stop. He states he does nothing to cause the episodes to happen. Denies URI symptoms, decreased hearing, painful or stuffy ears. Denies dizziness with movement of head. Denies changes in vision, slurred speech, facial droop, unilateral weakness. No history of strokes. Does have a history of DVT. He does report having nausea and vomiting with severe episodes. Last episode was last evening. He has had no episodes today.         The following portions of the patient's history were reviewed and updated as appropriate: allergies, current medications, past family history, past medical history, past social history, past surgical history and problem list.    Review of Systems   Constitutional: Negative for activity change, appetite change, fatigue, unexpected weight gain and unexpected weight loss.   HENT: Negative for congestion, sore throat, trouble swallowing and voice change.    Eyes: Negative.    Respiratory: Negative for cough, chest tightness, shortness of breath and wheezing.    Cardiovascular: Negative for chest pain, palpitations and leg swelling.   Gastrointestinal: Positive for nausea and vomiting. Negative for abdominal pain and diarrhea.   Endocrine: Negative.    Genitourinary: Negative for dysuria, hematuria and urgency.   Musculoskeletal: Negative for arthralgias and myalgias.   Skin: Negative for rash.   Neurological: Positive for dizziness and headache. Negative for tremors, seizures, syncope, facial asymmetry, speech difficulty, weakness, light-headedness, numbness, memory problem and confusion.   Hematological:  Negative.    Psychiatric/Behavioral: Negative.        Objective   Physical Exam   Constitutional: He is oriented to person, place, and time. He appears well-developed and well-nourished. No distress.   HENT:   Head: Normocephalic and atraumatic.   Right Ear: External ear normal. Tympanic membrane is not perforated and not erythematous. No middle ear effusion.   Left Ear: External ear normal. Tympanic membrane is not perforated and not erythematous.  No middle ear effusion.   Nose: Nose normal.   Mouth/Throat: Oropharynx is clear and moist.   Negative Darshana-Hallpike   Eyes: Conjunctivae and EOM are normal. Pupils are equal, round, and reactive to light.   Neck: Normal range of motion.   Cardiovascular: Normal rate, regular rhythm, normal heart sounds and intact distal pulses. Exam reveals no gallop and no friction rub.   No murmur heard.  Pulmonary/Chest: Effort normal and breath sounds normal. No respiratory distress. He has no wheezes. He has no rales.   Abdominal: Soft. Bowel sounds are normal. He exhibits no distension and no mass. There is no tenderness. There is no rebound and no guarding. No hernia.   Musculoskeletal: Normal range of motion. He exhibits no edema or tenderness.   Neurological: He is alert and oriented to person, place, and time. He is not disoriented. He displays no atrophy and no tremor. No cranial nerve deficit or sensory deficit. He exhibits normal muscle tone. He displays no seizure activity. Coordination and gait normal.   Skin: Skin is warm and dry. No rash noted. He is not diaphoretic. No erythema. No pallor.   Psychiatric: He has a normal mood and affect. His speech is normal and behavior is normal. Judgment and thought content normal. He is not actively hallucinating. Cognition and memory are normal. He is attentive.   Nursing note and vitals reviewed.        Assessment/Plan   Josh was seen today for dizziness.    Diagnoses and all orders for this visit:    Vertigo  -     MRI Brain  Without Contrast; Future    Nonintractable episodic headache, unspecified headache type  -     MRI Brain Without Contrast; Future    Other orders  -     meclizine (ANTIVERT) 12.5 MG tablet; Take 1 tablet by mouth 3 (Three) Times a Day As Needed for dizziness.    1. Vertigo-MRI of the brain without contrast scheduled for tomorrow, 2/6/2019. Will call with results. Meclizine 12.5 mg 1 tablet 3 times a day as needed for dizziness    2. Episodic headache-MRI of the brain without contrast scheduled for tomorrow, 2/6/2019. Will call with results.    3. Patient instructed to present to the emergency room if he has another episode of vertigo or worsening headache symptoms. Patient verbalized understanding of instruction. Further plan of care and follow-up pending MRI results.            This document has been electronically signed by HELEN Rosenbaum on February 5, 2019 4:32 PM

## 2019-02-13 ENCOUNTER — TELEPHONE (OUTPATIENT)
Dept: FAMILY MEDICINE CLINIC | Facility: CLINIC | Age: 64
End: 2019-02-13

## 2019-02-13 NOTE — TELEPHONE ENCOUNTER
Pt is wondering if we have received his MRI results yet. If we have please call with results. Thanks!

## 2019-02-14 ENCOUNTER — TELEPHONE (OUTPATIENT)
Dept: FAMILY MEDICINE CLINIC | Facility: CLINIC | Age: 64
End: 2019-02-14

## 2019-02-14 NOTE — TELEPHONE ENCOUNTER
No acute process per radiology report. Follow up with pcp if symptoms continue or ER if symptoms are severe or appear to be stroke like symptoms.

## 2019-02-14 NOTE — TELEPHONE ENCOUNTER
Per HELEN Lazo, Josh Elisa was called and given recent MRI results. Follow up with PCP if symptoms continue.  If symptoms worsen instructed to go to ER.  Continue current meds and follow up as planned or sooner if any problems.

## 2019-02-15 DIAGNOSIS — R42 DIZZINESS: ICD-10-CM

## 2019-02-15 DIAGNOSIS — R93.0 ABNORMAL MRI OF HEAD: Primary | ICD-10-CM

## 2020-06-26 ENCOUNTER — OFFICE VISIT (OUTPATIENT)
Dept: FAMILY MEDICINE CLINIC | Facility: CLINIC | Age: 65
End: 2020-06-26

## 2020-06-26 ENCOUNTER — LAB (OUTPATIENT)
Dept: LAB | Facility: HOSPITAL | Age: 65
End: 2020-06-26

## 2020-06-26 ENCOUNTER — HOSPITAL ENCOUNTER (OUTPATIENT)
Dept: ULTRASOUND IMAGING | Facility: HOSPITAL | Age: 65
Discharge: HOME | End: 2020-06-26
Admitting: NURSE PRACTITIONER

## 2020-06-26 VITALS
BODY MASS INDEX: 36.81 KG/M2 | DIASTOLIC BLOOD PRESSURE: 84 MMHG | SYSTOLIC BLOOD PRESSURE: 160 MMHG | HEART RATE: 89 BPM | WEIGHT: 262.9 LBS | RESPIRATION RATE: 18 BRPM | HEIGHT: 71 IN | OXYGEN SATURATION: 99 %

## 2020-06-26 DIAGNOSIS — M25.551 RIGHT HIP PAIN: ICD-10-CM

## 2020-06-26 DIAGNOSIS — Z00.00 MEDICARE ANNUAL WELLNESS VISIT, SUBSEQUENT: Primary | ICD-10-CM

## 2020-06-26 DIAGNOSIS — M25.562 CHRONIC PAIN OF LEFT KNEE: ICD-10-CM

## 2020-06-26 DIAGNOSIS — M79.605 LEFT LEG PAIN: ICD-10-CM

## 2020-06-26 DIAGNOSIS — N52.9 ED (ERECTILE DYSFUNCTION) OF ORGANIC ORIGIN: ICD-10-CM

## 2020-06-26 DIAGNOSIS — I10 BENIGN ESSENTIAL HTN: ICD-10-CM

## 2020-06-26 DIAGNOSIS — Z76.89 ENCOUNTER TO ESTABLISH CARE: ICD-10-CM

## 2020-06-26 DIAGNOSIS — Z12.5 ENCOUNTER FOR SCREENING FOR MALIGNANT NEOPLASM OF PROSTATE: ICD-10-CM

## 2020-06-26 DIAGNOSIS — Z86.718 HISTORY OF DVT IN ADULTHOOD: ICD-10-CM

## 2020-06-26 DIAGNOSIS — Z00.00 ANNUAL PHYSICAL EXAM: ICD-10-CM

## 2020-06-26 DIAGNOSIS — I82.432 ACUTE DEEP VEIN THROMBOSIS (DVT) OF POPLITEAL VEIN OF LEFT LOWER EXTREMITY (HCC): ICD-10-CM

## 2020-06-26 DIAGNOSIS — E66.01 MORBIDLY OBESE (HCC): ICD-10-CM

## 2020-06-26 DIAGNOSIS — J30.2 SEASONAL ALLERGIES: ICD-10-CM

## 2020-06-26 DIAGNOSIS — R60.9 PERIPHERAL EDEMA: ICD-10-CM

## 2020-06-26 DIAGNOSIS — G89.29 CHRONIC PAIN OF LEFT KNEE: ICD-10-CM

## 2020-06-26 PROBLEM — I82.439 ACUTE DEEP VEIN THROMBOSIS (DVT) OF POPLITEAL VEIN (HCC): Status: RESOLVED | Noted: 2018-08-14 | Resolved: 2020-06-26

## 2020-06-26 LAB
ALBUMIN SERPL-MCNC: 4.5 G/DL (ref 3.5–5.2)
ALBUMIN/GLOB SERPL: 1.6 G/DL
ALP SERPL-CCNC: 55 U/L (ref 39–117)
ALT SERPL W P-5'-P-CCNC: 34 U/L (ref 1–41)
ANION GAP SERPL CALCULATED.3IONS-SCNC: 10 MMOL/L (ref 5–15)
AST SERPL-CCNC: 42 U/L (ref 1–40)
BASOPHILS # BLD AUTO: 0.04 10*3/MM3 (ref 0–0.2)
BASOPHILS NFR BLD AUTO: 0.6 % (ref 0–1.5)
BILIRUB SERPL-MCNC: 0.3 MG/DL (ref 0.2–1.2)
BUN BLD-MCNC: 21 MG/DL (ref 8–23)
BUN/CREAT SERPL: 23.3 (ref 7–25)
CALCIUM SPEC-SCNC: 9.6 MG/DL (ref 8.6–10.5)
CHLORIDE SERPL-SCNC: 103 MMOL/L (ref 98–107)
CHOLEST SERPL-MCNC: 180 MG/DL (ref 0–200)
CO2 SERPL-SCNC: 27 MMOL/L (ref 22–29)
CREAT BLD-MCNC: 0.9 MG/DL (ref 0.76–1.27)
DEPRECATED RDW RBC AUTO: 46.5 FL (ref 37–54)
EOSINOPHIL # BLD AUTO: 0.22 10*3/MM3 (ref 0–0.4)
EOSINOPHIL NFR BLD AUTO: 3.3 % (ref 0.3–6.2)
ERYTHROCYTE [DISTWIDTH] IN BLOOD BY AUTOMATED COUNT: 13.7 % (ref 12.3–15.4)
GFR SERPL CREATININE-BSD FRML MDRD: 85 ML/MIN/1.73
GLOBULIN UR ELPH-MCNC: 2.9 GM/DL
GLUCOSE BLD-MCNC: 89 MG/DL (ref 65–99)
HBA1C MFR BLD: 6.3 % (ref 4.8–5.6)
HCT VFR BLD AUTO: 40.4 % (ref 37.5–51)
HCV AB SER DONR QL: NORMAL
HDLC SERPL-MCNC: 34 MG/DL (ref 40–60)
HGB BLD-MCNC: 13.8 G/DL (ref 13–17.7)
IMM GRANULOCYTES # BLD AUTO: 0.02 10*3/MM3 (ref 0–0.05)
IMM GRANULOCYTES NFR BLD AUTO: 0.3 % (ref 0–0.5)
LDLC SERPL CALC-MCNC: 101 MG/DL (ref 0–100)
LDLC/HDLC SERPL: 2.98 {RATIO}
LYMPHOCYTES # BLD AUTO: 2.19 10*3/MM3 (ref 0.7–3.1)
LYMPHOCYTES NFR BLD AUTO: 33.3 % (ref 19.6–45.3)
MCH RBC QN AUTO: 31.6 PG (ref 26.6–33)
MCHC RBC AUTO-ENTMCNC: 34.2 G/DL (ref 31.5–35.7)
MCV RBC AUTO: 92.4 FL (ref 79–97)
MONOCYTES # BLD AUTO: 0.64 10*3/MM3 (ref 0.1–0.9)
MONOCYTES NFR BLD AUTO: 9.7 % (ref 5–12)
NEUTROPHILS # BLD AUTO: 3.47 10*3/MM3 (ref 1.7–7)
NEUTROPHILS NFR BLD AUTO: 52.8 % (ref 42.7–76)
NRBC BLD AUTO-RTO: 0 /100 WBC (ref 0–0.2)
PLATELET # BLD AUTO: 276 10*3/MM3 (ref 140–450)
PMV BLD AUTO: 10.1 FL (ref 6–12)
POTASSIUM BLD-SCNC: 4.3 MMOL/L (ref 3.5–5.2)
PROT SERPL-MCNC: 7.4 G/DL (ref 6–8.5)
RBC # BLD AUTO: 4.37 10*6/MM3 (ref 4.14–5.8)
SODIUM BLD-SCNC: 140 MMOL/L (ref 136–145)
T4 FREE SERPL-MCNC: 0.96 NG/DL (ref 0.93–1.7)
TRIGL SERPL-MCNC: 224 MG/DL (ref 0–150)
TSH SERPL DL<=0.05 MIU/L-ACNC: 1.98 UIU/ML (ref 0.27–4.2)
VLDLC SERPL-MCNC: 44.8 MG/DL
WBC NRBC COR # BLD: 6.58 10*3/MM3 (ref 3.4–10.8)

## 2020-06-26 PROCEDURE — 84439 ASSAY OF FREE THYROXINE: CPT

## 2020-06-26 PROCEDURE — 84443 ASSAY THYROID STIM HORMONE: CPT

## 2020-06-26 PROCEDURE — 99214 OFFICE O/P EST MOD 30 MIN: CPT | Performed by: NURSE PRACTITIONER

## 2020-06-26 PROCEDURE — 80053 COMPREHEN METABOLIC PANEL: CPT

## 2020-06-26 PROCEDURE — 80061 LIPID PANEL: CPT

## 2020-06-26 PROCEDURE — 86803 HEPATITIS C AB TEST: CPT

## 2020-06-26 PROCEDURE — 83036 HEMOGLOBIN GLYCOSYLATED A1C: CPT

## 2020-06-26 PROCEDURE — 85025 COMPLETE CBC W/AUTO DIFF WBC: CPT

## 2020-06-26 PROCEDURE — 96160 PT-FOCUSED HLTH RISK ASSMT: CPT | Performed by: NURSE PRACTITIONER

## 2020-06-26 PROCEDURE — G0103 PSA SCREENING: HCPCS

## 2020-06-26 PROCEDURE — G0402 INITIAL PREVENTIVE EXAM: HCPCS | Performed by: NURSE PRACTITIONER

## 2020-06-26 PROCEDURE — 93971 EXTREMITY STUDY: CPT

## 2020-06-26 PROCEDURE — 36415 COLL VENOUS BLD VENIPUNCTURE: CPT

## 2020-06-26 RX ORDER — SILDENAFIL 100 MG/1
100 TABLET, FILM COATED ORAL AS NEEDED
Qty: 30 TABLET | Refills: 3 | Status: SHIPPED | OUTPATIENT
Start: 2020-06-26

## 2020-06-26 RX ORDER — LOSARTAN POTASSIUM 25 MG/1
25 TABLET ORAL DAILY
Qty: 90 TABLET | Refills: 1 | Status: CANCELLED | OUTPATIENT
Start: 2020-06-26

## 2020-06-26 RX ORDER — ASPIRIN 81 MG/1
81 TABLET ORAL DAILY
Qty: 90 TABLET | Refills: 1 | Status: CANCELLED | OUTPATIENT
Start: 2020-06-26

## 2020-06-26 RX ORDER — FLUTICASONE PROPIONATE 50 MCG
2 SPRAY, SUSPENSION (ML) NASAL DAILY
Qty: 18.2 ML | Refills: 3 | Status: SHIPPED | OUTPATIENT
Start: 2020-06-26 | End: 2020-12-28 | Stop reason: SDUPTHER

## 2020-06-26 RX ORDER — ASPIRIN 81 MG/1
81 TABLET ORAL DAILY
Qty: 30 TABLET | Refills: 5 | Status: SHIPPED | OUTPATIENT
Start: 2020-06-26

## 2020-06-26 RX ORDER — TADALAFIL 20 MG/1
20 TABLET ORAL
COMMUNITY
Start: 2012-02-22 | End: 2020-06-26 | Stop reason: ALTCHOICE

## 2020-06-26 RX ORDER — HYDROCHLOROTHIAZIDE 12.5 MG/1
12.5 TABLET ORAL DAILY
Qty: 30 TABLET | Refills: 5 | Status: SHIPPED | OUTPATIENT
Start: 2020-06-26 | End: 2020-12-28 | Stop reason: SDUPTHER

## 2020-06-26 RX ORDER — LORATADINE ORAL 5 MG/5ML
10 SOLUTION ORAL DAILY
Qty: 180 ML | Refills: 12 | Status: SHIPPED | OUTPATIENT
Start: 2020-06-26 | End: 2022-01-10

## 2020-06-26 NOTE — PATIENT INSTRUCTIONS
Medicare Wellness  Personal Prevention Plan of Service     Date of Office Visit:  2020  Encounter Provider:  HELEN Rosenbaum  Place of Service:  [facility]  Patient Name: Josh Pérez  :  1955    As part of the Medicare Wellness portion of your visit today, we are providing you with this personalized preventive plan of services (PPPS). This plan is based upon recommendations of the [place] Preventive Services Task Force (USPSTF) and the Advisory Committee on Immunization Practices (ACIP).    This lists the preventive care services that should be considered, and provides dates of when you are due. Items listed as completed are up-to-date and do not require any further intervention.    Health Maintenance   Topic Date Due   • ZOSTER VACCINE (1 of 2) 2005   • HEPATITIS C SCREENING  2018   • Pneumococcal Vaccine Once at 65 Years Old  2020 (Originally 2020)   • TDAP/TD VACCINES (2 - Tdap) 2020 (Originally 2019)   • INFLUENZA VACCINE  2020   • MEDICARE ANNUAL WELLNESS  2021   • COLONOSCOPY  03/15/2027       No orders of the defined types were placed in this encounter.      No follow-ups on file.

## 2020-06-26 NOTE — PROGRESS NOTES
Per radiology report he has a nonocclusive thrombus in his left popliteal vein.  Per radiology report this was then a similar location to the one he had in 2018.  I did a phone consultation with cardiovascular surgery.  They request that he be started on baby aspirin 1 tablet daily and Eliquis 10 mg twice a day for 7 days and then 5 mg twice a day thereafter.  A referral has been placed with Marcel Cast and cardiovascular surgery.  He had seen her in 2018.  He needs to monitor for signs of bleeding including excessive bruising, black tarry stools or bleeding that does not stop.  If he develops chest pain, shortness of breath palpitations or signs of excessive bleeding he needs to go to the ER.  Otherwise he needs to follow-up with me as scheduled and cardiovascular surgery as scheduled.

## 2020-06-26 NOTE — PROGRESS NOTES
Subjective   Josh Pérez is a 65 y.o. male.     Mr. Pérez is a 65-year-old male who presents today for Medicare wellness visit and to establish care for the evaluation management of hypertension, erectile dysfunction, history of DVT, obesity, left leg pain, chronic left knee pain, right hip pain, bilateral lower extremity edema, seasonal allergies.  Blood pressure today is 160/84, heart rate 89.  Blood pressure repeated after 15 minutes of rest and was 164/82.  Patient currently takes no blood pressure medication.  He denies chest pain, shortness of breath, palpitations.  He does report mild bilateral lower extremity edema.  He reports left leg edema is worse than right.  Patient did have a DVT in his left popliteal vein in 2018.  In reviewing his records he was briefly on Eliquis.  Follow-up exam showed resolution of DVT.  Patient states he was subsequently taken off of anticoagulation.  Patient's record only shows 1 follow-up with cardiovascular surgery.  Patient also reports history of left knee pain due to meniscal tear.  Again patient had seen an orthopedic surgeon in 2018 and no follow-up beyond that point.  He also reports right hip pain.  He states he was seen by chiropractor who stated that his right hip pain originated from his back as evident by degenerative disc disease noted on x-ray.  These records are not available for review.  Patient did have a previous PCP but states he had not engaged in routine follow-up with her over the last 2 years.  The only medication patient takes currently is Viagra 100 mg p.o. daily as needed for erectile dysfunction.  He reports this adequately controls his ED symptoms and he reports no side effects to medication.  Patient reports chronic seasonal allergies but currently takes no medication for his symptoms.  He reports significant posterior nasal drainage and nasal congestion that is worse at night.       The following portions of the patient's history were reviewed  and updated as appropriate: allergies, current medications, past family history, past medical history, past social history, past surgical history and problem list.    Review of Systems   Constitutional: Negative for activity change, appetite change, chills, diaphoresis, fatigue, fever, unexpected weight gain and unexpected weight loss.   HENT: Negative for congestion, sore throat, trouble swallowing and voice change.    Eyes: Negative for blurred vision, double vision, photophobia, pain and visual disturbance.   Respiratory: Negative for cough, chest tightness, shortness of breath and wheezing.    Cardiovascular: Positive for palpitations (ble). Negative for chest pain and leg swelling.   Gastrointestinal: Negative for abdominal distention, abdominal pain, anal bleeding, blood in stool, constipation, diarrhea, nausea, vomiting, GERD and indigestion.   Endocrine: Negative for cold intolerance, heat intolerance, polydipsia, polyphagia and polyuria.   Genitourinary: Positive for erectile dysfunction (controlled with Viagra, tolerates medication well without any reported side effects except next day headache. ). Negative for dysuria, hematuria and urgency.   Musculoskeletal: Positive for arthralgias (Chronic left knee and right hip pain.) and gait problem. Negative for back pain and myalgias.   Skin: Negative for rash.   Allergic/Immunologic: Positive for environmental allergies. Negative for food allergies and immunocompromised state.   Neurological: Negative for dizziness, syncope, weakness, light-headedness and headache.   Hematological: Negative.    Psychiatric/Behavioral: Negative for depressed mood.       Objective   Physical Exam   Constitutional: He is oriented to person, place, and time. He appears well-developed and well-nourished. No distress.   HENT:   Head: Normocephalic and atraumatic.   Right Ear: External ear normal.   Left Ear: External ear normal.   Nose: Nose normal.   Mouth/Throat: Oropharynx is  "clear and moist.   Eyes: Pupils are equal, round, and reactive to light. Conjunctivae and EOM are normal.   Neck: Normal range of motion and full passive range of motion without pain. Neck supple. Carotid bruit is not present. No tracheal deviation present. No thyroid mass and no thyromegaly present.   Cardiovascular: Normal rate, regular rhythm, normal heart sounds and intact distal pulses. Exam reveals no gallop and no friction rub.   No murmur heard.  Pulmonary/Chest: Effort normal and breath sounds normal. No stridor. No respiratory distress. He has no wheezes. He has no rales.   Abdominal: Soft. Bowel sounds are normal. He exhibits no distension and no mass. There is no tenderness. There is no rebound and no guarding. No hernia.   Musculoskeletal: Normal range of motion. He exhibits no tenderness.        Right lower leg: He exhibits edema (1+ pitting, mild tenderness).        Left lower leg: He exhibits edema (1+pitting, mild tenderness).   Mild 1+ edema to bilateral lower extremities with mild tenderness with palpation.  No calf tenderness, palpable mass, redness or warmth noted bilaterally.  Patient does have a history of left calf DVT.  He reports taking a baby aspirin daily \"when I remember\".   Lymphadenopathy:     He has no cervical adenopathy.   Neurological: He is alert and oriented to person, place, and time. No cranial nerve deficit. Coordination normal.   Skin: Skin is warm and dry. No rash noted. He is not diaphoretic. No erythema. No pallor.   Psychiatric: He has a normal mood and affect. His behavior is normal. Judgment and thought content normal.   Nursing note and vitals reviewed.        Assessment/Plan   Josh was seen today for [facility] and medicare wellness-initial visit.    Diagnoses and all orders for this visit:    Medicare annual wellness visit, subsequent    Annual physical exam  -     CBC & Differential; Future  -     Comprehensive Metabolic Panel; Future  -     Hemoglobin A1c; " Future  -     Lipid Panel; Future  -     TSH; Future  -     T4, Free; Future  -     PSA Screen; Future  -     Hepatitis C Antibody; Future    Encounter to establish care    Benign essential HTN    ED (erectile dysfunction) of organic origin    History of DVT in adulthood    Morbidly obese (CMS/HCC)    Left leg pain  -     US Venous Doppler Lower Extremity Left (duplex); Future    Right hip pain  -     XR Hip With or Without Pelvis 2 - 3 View Right; Future  -     XR Spine Lumbar Complete 4+VW; Future  -     Ambulatory Referral to Orthopedic Surgery    Seasonal allergies    Peripheral edema  -     US Venous Doppler Lower Extremity Left (duplex); Future    Encounter for screening for malignant neoplasm of prostate   -     PSA Screen; Future    Acute deep vein thrombosis (DVT) of popliteal vein of left lower extremity (CMS/HCC)  -     Ambulatory Referral to Cardiovascular Surgery    Chronic pain of left knee  -     Ambulatory Referral to Orthopedic Surgery    Other orders  -     loratadine (Claritin) 5 MG/5ML syrup; Take 10 mL by mouth Daily.  -     fluticasone (Flonase) 50 MCG/ACT nasal spray; 2 sprays into the nostril(s) as directed by provider Daily.  -     hydroCHLOROthiazide (HYDRODIURIL) 12.5 MG tablet; Take 1 tablet by mouth Daily.  -     sildenafil (Viagra) 100 MG tablet; Take 1 tablet by mouth As Needed for Erectile Dysfunction.  -     Apixaban (ELIQUIS DVT/PE STARTER PACK) tablet; Take two 5 mg tablets by mouth every 12 hours for 7 days. Followed by one 5 mg tablet every 12 hours. (Dispense starter pack if available)  -     aspirin (aspirin) 81 MG EC tablet; Take 1 tablet by mouth Daily.             The ABCs of the Annual Wellness Visit  Subsequent Medicare Wellness Visit    Chief Complaint   Patient presents with   • Establish Care   • Medicare Wellness-Initial Visit       Subjective   History of Present Illness:  Josh Pérez is a 65 y.o. male who presents for a Subsequent Medicare Wellness  Visit.    HEALTH RISK ASSESSMENT    Recent Hospitalizations:  No hospitalization(s) within the last year.    Current Medical Providers:  Patient Care Team:  Ian Hastings APRN as PCP - General (Nurse Practitioner)  Joseph Eddy MD as Surgeon (Orthopedic Surgery)    Smoking Status:  Social History     Tobacco Use   Smoking Status Never Smoker   Smokeless Tobacco Never Used       Alcohol Consumption:  Social History     Substance and Sexual Activity   Alcohol Use No       Depression Screen:   PHQ-2/PHQ-9 Depression Screening 6/26/2020   Little interest or pleasure in doing things 0   Feeling down, depressed, or hopeless 0   Trouble falling or staying asleep, or sleeping too much 0   Feeling tired or having little energy 0   Poor appetite or overeating 0   Feeling bad about yourself - or that you are a failure or have let yourself or your family down 0   Trouble concentrating on things, such as reading the newspaper or watching television 0   Moving or speaking so slowly that other people could have noticed. Or the opposite - being so fidgety or restless that you have been moving around a lot more than usual 0   Thoughts that you would be better off dead, or of hurting yourself in some way 0   Total Score 0   If you checked off any problems, how difficult have these problems made it for you to do your work, take care of things at home, or get along with other people? Not difficult at all       Fall Risk Screen:  STEADI Fall Risk Assessment was completed, and patient is at LOW risk for falls.Assessment completed on:6/26/2020    Health Habits and Functional and Cognitive Screening:  Functional & Cognitive Status 6/26/2020   Do you have difficulty preparing food and eating? No   Do you have difficulty bathing yourself, getting dressed or grooming yourself? No   Do you have difficulty using the toilet? No   Do you have difficulty moving around from place to place? No   Do you have trouble with steps or  getting out of a bed or a chair? Yes   Current Diet Unhealthy Diet   Dental Exam Not up to date   Eye Exam Up to date   Exercise (times per week) 6 times per week   Current Exercise Activities Include Walking   Do you need help using the phone?  No   Are you deaf or do you have serious difficulty hearing?  No   Do you need help with transportation? No   Do you need help shopping? No   Do you need help preparing meals?  No   Do you need help with housework?  No   Do you need help with laundry? No   Do you need help taking your medications? No   Do you need help managing money? No   Do you ever drive or ride in a car without wearing a seat belt? No   Have you felt unusual stress, anger or loneliness in the last month? No   Who do you live with? Spouse   If you need help, do you have trouble finding someone available to you? No   Have you been bothered in the last four weeks by sexual problems? No   Do you have difficulty concentrating, remembering or making decisions? No         Does the patient have evidence of cognitive impairment? No    Asprin use counseling:getting us, may place on asa after results.     Age-appropriate Screening Schedule:  Refer to the list below for future screening recommendations based on patient's age, sex and/or medical conditions. Orders for these recommended tests are listed in the plan section. The patient has been provided with a written plan.    Health Maintenance   Topic Date Due   • ZOSTER VACCINE (1 of 2) 02/09/2005   • TDAP/TD VACCINES (2 - Tdap) 11/02/2020 (Originally 7/8/2019)   • INFLUENZA VACCINE  08/01/2020   • COLONOSCOPY  03/15/2027          The following portions of the patient's history were reviewed and updated as appropriate: allergies, current medications, past family history, past medical history, past social history, past surgical history and problem list.    Outpatient Medications Prior to Visit   Medication Sig Dispense Refill   • Sildenafil Citrate (VIAGRA PO) Take   by mouth.     • tadalafil (Cialis) 20 MG tablet Take 20 mg by mouth.     • guaiFENesin (ROBITUSSIN) 100 MG/5ML syrup Take 200 mg by mouth 2 (Two) Times a Day As Needed for cough.     • meclizine (ANTIVERT) 12.5 MG tablet Take 1 tablet by mouth 3 (Three) Times a Day As Needed for dizziness. 30 tablet 1     No facility-administered medications prior to visit.        Patient Active Problem List   Diagnosis   • Left knee pain   • Tear of medial meniscus of left knee, current, initial encounter   • Left leg swelling   • Heart palpitations   • Shortness of breath   • Benign essential HTN   • Dyspepsia and other specified disorders of function of stomach   • ED (erectile dysfunction) of organic origin   • Right hip pain   • Hypertrophy of prostate without urinary obstruction and other lower urinary tract symptoms (LUTS)   • Routine history and physical examination of adult   • History of DVT in adulthood   • Morbidly obese (CMS/HCC)   • Seasonal allergies   • Peripheral edema       Advanced Care Planning:  ACP discussion was held with the patient during this visit. Patient does not have an advance directive, information provided.    Review of Systems   Constitutional: Negative for activity change, appetite change, chills, diaphoresis, fatigue, fever, weight gain and weight loss.   HENT: Negative for congestion, sore throat, trouble swallowing and voice change.    Eyes: Negative for blurred vision, double vision, photophobia, pain and visual disturbance.   Respiratory: Negative for cough, chest tightness, shortness of breath and wheezing.    Cardiovascular: Positive for palpitations (ble). Negative for chest pain and leg swelling.   Gastrointestinal: Negative for abdominal distention, abdominal pain, anal bleeding, blood in stool, constipation, diarrhea, nausea and vomiting.   Endocrine: Negative for cold intolerance, heat intolerance, polydipsia, polyphagia and polyuria.   Genitourinary: Negative for dysuria, hematuria and  "urgency.   Musculoskeletal: Positive for arthralgias (Chronic left knee and right hip pain.) and gait problem. Negative for back pain and myalgias.   Skin: Negative for rash.   Allergic/Immunologic: Positive for environmental allergies. Negative for food allergies and immunocompromised state.   Neurological: Negative for dizziness, syncope, weakness and light-headedness.   Hematological: Negative.        Compared to one year ago, the patient feels his physical health is the same.  Compared to one year ago, the patient feels his mental health is the same.    Reviewed chart for potential of high risk medication in the elderly:     Reviewed chart for potential of harmful drug interactions in the elderly:yes    Objective         Vitals:    06/26/20 1116   BP: 160/84   BP Location: Left arm   Patient Position: Sitting   Cuff Size: Adult   Pulse: 89   Resp: 18   SpO2: 99%   Weight: 119 kg (262 lb 14.4 oz)   Height: 180.1 cm (70.9\")   PainSc: 0-No pain       Body mass index is 36.77 kg/m².  Discussed the patient's BMI with him. The BMI is above average; BMI management plan is completed.    Physical Exam   Constitutional: He is oriented to person, place, and time. He appears well-developed and well-nourished. No distress.   HENT:   Head: Normocephalic and atraumatic.   Right Ear: External ear normal.   Left Ear: External ear normal.   Nose: Nose normal.   Mouth/Throat: Oropharynx is clear and moist.   Eyes: Pupils are equal, round, and reactive to light. Conjunctivae and EOM are normal.   Neck: Normal range of motion and full passive range of motion without pain. Neck supple. Carotid bruit is not present. No tracheal deviation present. No thyroid mass and no thyromegaly present.   Cardiovascular: Normal rate, regular rhythm, normal heart sounds and intact distal pulses. Exam reveals no gallop and no friction rub.   No murmur heard.  Pulmonary/Chest: Effort normal and breath sounds normal. No stridor. No respiratory " "distress. He has no wheezes. He has no rales.   Abdominal: Soft. Bowel sounds are normal. He exhibits no distension and no mass. There is no tenderness. There is no rebound and no guarding. No hernia.   Musculoskeletal: Normal range of motion. He exhibits no tenderness.        Right lower leg: He exhibits edema (1+ pitting, mild tenderness).        Left lower leg: He exhibits edema (1+pitting, mild tenderness).   Mild 1+ edema to bilateral lower extremities with mild tenderness with palpation.  No calf tenderness, palpable mass, redness or warmth noted bilaterally.  Patient does have a history of left calf DVT.  He reports taking a baby aspirin daily \"when I remember\".   Lymphadenopathy:     He has no cervical adenopathy.   Neurological: He is alert and oriented to person, place, and time. No cranial nerve deficit. Coordination normal.   Skin: Skin is warm and dry. No rash noted. He is not diaphoretic. No erythema. No pallor.   Psychiatric: He has a normal mood and affect. His behavior is normal. Judgment and thought content normal.   Nursing note and vitals reviewed.      Lab Results   Component Value Date    TRIG 224 (H) 06/26/2020    HDL 34 (L) 06/26/2020     (H) 06/26/2020    VLDL 44.8 06/26/2020        Assessment/Plan   Medicare Risks and Personalized Health Plan  CMS Preventative Services Quick Reference  Advance Directive Discussion  Cardiovascular risk  Immunizations Discussed/Encouraged (specific immunizations; adacel Tdap, Pneumococcal 23 and Shingrix )  Obesity/Overweight     The above risks/problems have been discussed with the patient.  Pertinent information has been shared with the patient in the After Visit Summary.  Follow up plans and orders are seen below in the Assessment/Plan Section.    Diagnoses and all orders for this visit:    1. Medicare annual wellness visit, subsequent (Primary)    2. Annual physical exam  -     CBC & Differential; Future  -     Comprehensive Metabolic Panel; " Future  -     Hemoglobin A1c; Future  -     Lipid Panel; Future  -     TSH; Future  -     T4, Free; Future  -     PSA Screen; Future  -     Hepatitis C Antibody; Future    3. Encounter to establish care    4. Benign essential HTN    5. ED (erectile dysfunction) of organic origin    6. History of DVT in adulthood    7. Morbidly obese (CMS/HCC)    8. Left leg pain  -     US Venous Doppler Lower Extremity Left (duplex); Future    9. Right hip pain  -     XR Hip With or Without Pelvis 2 - 3 View Right; Future  -     XR Spine Lumbar Complete 4+VW; Future  -     Ambulatory Referral to Orthopedic Surgery    10. Seasonal allergies    11. Peripheral edema  -     US Venous Doppler Lower Extremity Left (duplex); Future    12. Encounter for screening for malignant neoplasm of prostate   -     PSA Screen; Future    13. Acute deep vein thrombosis (DVT) of popliteal vein of left lower extremity (CMS/HCC)  -     Ambulatory Referral to Cardiovascular Surgery    14. Chronic pain of left knee  -     Ambulatory Referral to Orthopedic Surgery    Other orders  -     Cancel: aspirin (aspirin) 81 MG EC tablet; Take 1 tablet by mouth Daily.  Dispense: 90 tablet; Refill: 1  -     Cancel: losartan (COZAAR) 25 MG tablet; Take 1 tablet by mouth Daily.  Dispense: 90 tablet; Refill: 1  -     loratadine (Claritin) 5 MG/5ML syrup; Take 10 mL by mouth Daily.  Dispense: 180 mL; Refill: 12  -     fluticasone (Flonase) 50 MCG/ACT nasal spray; 2 sprays into the nostril(s) as directed by provider Daily.  Dispense: 18.2 mL; Refill: 3  -     hydroCHLOROthiazide (HYDRODIURIL) 12.5 MG tablet; Take 1 tablet by mouth Daily.  Dispense: 30 tablet; Refill: 5  -     sildenafil (Viagra) 100 MG tablet; Take 1 tablet by mouth As Needed for Erectile Dysfunction.  Dispense: 30 tablet; Refill: 3  -     Apixaban (ELIQUIS DVT/PE STARTER PACK) tablet; Take two 5 mg tablets by mouth every 12 hours for 7 days. Followed by one 5 mg tablet every 12 hours. (Dispense starter  pack if available)  Dispense: 74 tablet; Refill: 0  -     aspirin (aspirin) 81 MG EC tablet; Take 1 tablet by mouth Daily.  Dispense: 30 tablet; Refill: 5      Follow Up:  Return in about 4 weeks (around 7/24/2020).     An After Visit Summary and PPPS were given to the patient.     1.  Benign essential hypertension- considering patient's concurrent edema will start patient on HCTZ 12.5 mg p.o. daily.  Instructed in low-sodium diet.  Monitor blood pressure at home.  We will continue to monitor.    2.  Acute deep vein thrombosis of popliteal vein of left lower extremity- per radiology report patient has DVT of left popliteal vein in a similar to the one noted in his ultrasound in 2018.  A phone consultation was made with HEL[facility] cardiovascular surgery.  Plan of care was formed including starting patient on aspirin 81 mg p.o. daily, acute dosing of Eliquis of 10 mg p.o. twice daily x7 days then 5 mg p.o. twice daily thereafter.  Referral was placed with cardiovascular surgery for follow-up and further evaluation.  Patient was called and notified of results, new medication and pending follow-up with cardiovascular surgery.  Patient instructed to monitor closely for signs of excessive anticoagulation including excessive bruising, tarry black stools, coffee-ground emesis and/or prolonged/profuse bleeding.  Patient verbalized understanding of instruction.    3.  Erectile dysfunction of organic origin- well controlled with Viagra 100 mg p.o. daily as needed with no reported side effects.  Refill sent to pharmacy.  Instructed patient is present to the ER if he has chest pain, shortness of breath, palpitations or an erection that lasts longer than 4 hours.  Patient verbalized understanding obstruction.    4.  Morbid obesity- routine labs ordered.  Will call results.  Instructed patient to follow a low-carb, high lean protein. 1700-calorie a day diet.  Routine exercise 3-5 times a week for at least 30 minutes.  We  will continue to monitor.    5.  Seasonal allergies-Claritin 5 mg / 5 mL, 10 mL's p.o. daily as needed for allergy symptoms.  Flonase 50 MCG/ACT 2 sprays each nostril nightly as needed for sinus congestion.  We will continue to monitor.    6.  Right hip pain- x-ray right hip unremarkable per radiology report.  X-ray of lumbar spine did show degenerative changes.  Will refer to orthopedic surgery for further evaluation.    7.  Peripheral edema- plan of care as stated above #1.  We will continue to monitor.    8.  Chronic pain of left knee.  Referral to orthopedic surgery for further evaluation.    9.  Health maintenance- routine labs ordered.  Will call with results.  Patient declined Tdap and pneumonia vaccine.    10.  Follow-up in 1 month or sooner if needed.            This document has been electronically signed by HELEN Rosenbaum on June 26, 2020 15:32

## 2020-06-27 LAB — PSA SERPL-MCNC: 1.73 NG/ML (ref 0–4)

## 2020-06-29 RX ORDER — ATORVASTATIN CALCIUM 20 MG/1
20 TABLET, FILM COATED ORAL NIGHTLY
Qty: 30 TABLET | Refills: 3 | Status: SHIPPED | OUTPATIENT
Start: 2020-06-29 | End: 2020-06-29 | Stop reason: SDUPTHER

## 2020-06-29 RX ORDER — ATORVASTATIN CALCIUM 20 MG/1
20 TABLET, FILM COATED ORAL NIGHTLY
Qty: 30 TABLET | Refills: 3 | Status: SHIPPED | OUTPATIENT
Start: 2020-06-29 | End: 2021-12-03 | Stop reason: ALTCHOICE

## 2020-06-29 NOTE — PROGRESS NOTES
PSA within normal limits.  Hemoglobin A1c elevated to prediabetic range, 6.3.  He needs to follow a low-carb diet.  We will have a discussion about medication at his follow-up appointment.  Cholesterol slightly elevated.  Considering his other risk factors I am going to start him on Lipitor 20 mg p.o. daily.  He needs follow a low-fat diet.  Follow-up as scheduled.

## 2020-07-16 ENCOUNTER — OFFICE VISIT (OUTPATIENT)
Dept: CARDIAC SURGERY | Facility: CLINIC | Age: 65
End: 2020-07-16

## 2020-07-16 ENCOUNTER — OFFICE VISIT (OUTPATIENT)
Dept: ORTHOPEDIC SURGERY | Facility: CLINIC | Age: 65
End: 2020-07-16

## 2020-07-16 VITALS
OXYGEN SATURATION: 99 % | WEIGHT: 259.2 LBS | BODY MASS INDEX: 37.11 KG/M2 | HEIGHT: 70 IN | HEART RATE: 96 BPM | DIASTOLIC BLOOD PRESSURE: 72 MMHG | SYSTOLIC BLOOD PRESSURE: 118 MMHG

## 2020-07-16 VITALS — HEIGHT: 70 IN | BODY MASS INDEX: 36.79 KG/M2 | WEIGHT: 257 LBS

## 2020-07-16 DIAGNOSIS — Z86.718 HISTORY OF DVT IN ADULTHOOD: ICD-10-CM

## 2020-07-16 DIAGNOSIS — I10 BENIGN ESSENTIAL HTN: ICD-10-CM

## 2020-07-16 DIAGNOSIS — M25.551 RIGHT HIP PAIN: ICD-10-CM

## 2020-07-16 DIAGNOSIS — I82.532 CHRONIC DEEP VEIN THROMBOSIS (DVT) OF POPLITEAL VEIN OF LEFT LOWER EXTREMITY (HCC): ICD-10-CM

## 2020-07-16 DIAGNOSIS — Z79.01 CURRENT USE OF LONG TERM ANTICOAGULATION: Primary | ICD-10-CM

## 2020-07-16 DIAGNOSIS — M54.16 LUMBAR BACK PAIN WITH RADICULOPATHY AFFECTING RIGHT LOWER EXTREMITY: Primary | ICD-10-CM

## 2020-07-16 DIAGNOSIS — Z79.01 CHRONIC ANTICOAGULATION: ICD-10-CM

## 2020-07-16 DIAGNOSIS — E66.01 MORBIDLY OBESE (HCC): ICD-10-CM

## 2020-07-16 DIAGNOSIS — I10 ESSENTIAL HYPERTENSION: ICD-10-CM

## 2020-07-16 PROCEDURE — 99214 OFFICE O/P EST MOD 30 MIN: CPT | Performed by: ORTHOPAEDIC SURGERY

## 2020-07-16 PROCEDURE — 99214 OFFICE O/P EST MOD 30 MIN: CPT | Performed by: NURSE PRACTITIONER

## 2020-07-16 NOTE — PATIENT INSTRUCTIONS
Stable Chronic DVT- LEFT Lower Extremity  Anticoagulation: Eliquis  Expected Length of therapy: lifelong (Recurrent DVT)  Partial Hypercoagulable Panel - Negative  Return as needed- PCP may follow anticoagulation    Notify provider if you experience excessive bleeding from the nose, cuts, gums, rectum, urinary tract, or vagina. Reddish or brown urine or stool. Vomiting of blood or hemorrhoidal bleeding. If major injury occurs present to the Emergency Department.  Return if further Rx assistance needed: Loss of prescriptive coverage, refills, cost of medications. If you should experience any additional leg pain with swelling or sudden onset of shortness of breath notify heart and vascular center immediately for evaluation.     Your BMI is 37 and falls within the overweight range. BMI is strongly correlated with increased health risks. Review options for weight management, heart healthy diet, and exercise programs.

## 2020-07-16 NOTE — PROGRESS NOTES
Josh Pérez is a 65 y.o. male   Primary provider:  Ian Hastings APRN       Chief Complaint   Patient presents with   • Right Hip - Pain   • Initial Evaluation     Xray 6/26/20       HISTORY OF PRESENT ILLNESS:    Patient is complaining of pain in his right hip and down the back of his right leg.  Patient states that it is worse in the morning but worsens throughout the day.  He has intermittent numbness and tingling in his right leg and in both feet.  He reports having some balance issues as well.  He does not report groin pain but instead complains of buttock pain.  He has some tightness and occasional pain in his lumbar spine as well.  No specific injury.  He reports that his pain and discomfort has been slowly worsening over the past several months.    Pain   The problem occurs intermittently. Associated symptoms include joint swelling and numbness. Associated symptoms comments: Aching. The symptoms are aggravated by standing and walking (sitting). He has tried rest for the symptoms. The treatment provided mild relief.        CONCURRENT MEDICAL HISTORY:    Past Medical History:   Diagnosis Date   • Benign essential HTN 10/22/2018       Allergies   Allergen Reactions   • Penicillins Rash         Current Outpatient Medications:   •  apixaban (ELIQUIS) 5 MG tablet tablet, Take 1 tablet by mouth Every 12 (Twelve) Hours. Indications: Other - full anticoagulation, Disp: 180 tablet, Rfl: 3  •  aspirin (aspirin) 81 MG EC tablet, Take 1 tablet by mouth Daily., Disp: 30 tablet, Rfl: 5  •  fluticasone (Flonase) 50 MCG/ACT nasal spray, 2 sprays into the nostril(s) as directed by provider Daily., Disp: 18.2 mL, Rfl: 3  •  hydroCHLOROthiazide (HYDRODIURIL) 12.5 MG tablet, Take 1 tablet by mouth Daily., Disp: 30 tablet, Rfl: 5  •  loratadine (Claritin) 5 MG/5ML syrup, Take 10 mL by mouth Daily., Disp: 180 mL, Rfl: 12  •  sildenafil (Viagra) 100 MG tablet, Take 1 tablet by mouth As Needed for Erectile Dysfunction.,  "Disp: 30 tablet, Rfl: 3  •  atorvastatin (Lipitor) 20 MG tablet, Take 1 tablet by mouth Every Night., Disp: 30 tablet, Rfl: 3    Past Surgical History:   Procedure Laterality Date   • COLONOSCOPY N/A 3/15/2017    Procedure: COLONOSCOPY;  Surgeon: Joseph Bustos DO;  Location: [facility] ENDOSCOPY;  Service:        Family History   Problem Relation Age of Onset   • Hypertension Mother    • COPD Mother    • Diabetes Mother    • Heart disease Mother    • Heart failure Mother    • Hypertension Father    • COPD Father    • Diabetes Father    • Heart disease Father    • Heart attack Brother    • Hypertension Brother    • No Known Problems Sister    • COPD Daughter    • Cancer Maternal Grandfather    • Heart disease Paternal Grandmother    • Dementia Paternal Grandfather    • Alzheimer's disease Paternal Grandfather    • No Known Problems Sister        Social History     Socioeconomic History   • Marital status:      Spouse name: Not on file   • Number of children: Not on file   • Years of education: Not on file   • Highest education level: Not on file   Tobacco Use   • Smoking status: Never Smoker   • Smokeless tobacco: Never Used   Substance and Sexual Activity   • Alcohol use: No   • Drug use: No   • Sexual activity: Yes     Partners: Female        Review of Systems   Constitutional: Negative.    HENT: Negative.    Eyes: Negative.    Respiratory: Negative.    Cardiovascular: Negative.    Gastrointestinal: Negative.    Endocrine: Negative.    Genitourinary: Negative.    Musculoskeletal: Positive for joint swelling.        Stiffness, muscle pain   Skin: Negative.    Allergic/Immunologic: Negative.    Neurological: Positive for numbness.        Tingling   Hematological: Negative.    Psychiatric/Behavioral: Negative.        PHYSICAL EXAMINATION:       Ht 177.8 cm (70\")   Wt 117 kg (257 lb)   BMI 36.88 kg/m²     Physical Exam   Constitutional: He is oriented to person, place, and time. He appears well-developed and " well-nourished.   Neurological: He is alert and oriented to person, place, and time.   Psychiatric: He has a normal mood and affect. His behavior is normal. Judgment and thought content normal.       GAIT:     []  Normal  []  Antalgic    Assistive device: []  None  []  Walker     []  Crutches  []  Cane     []  Wheelchair  []  Stretcher    Right Hip Exam     Tenderness   The patient is experiencing no tenderness.     Range of Motion   Flexion: normal   External rotation: normal   Internal rotation: normal     Muscle Strength   The patient has normal right hip strength.    Tests   RICKY: negative  Fadir:  Negative FADIR test    Other   Erythema: absent  Sensation: normal  Pulse: present    Comments:  Mildly positive straight leg raise      Left Hip Exam     Tenderness   The patient is experiencing no tenderness.     Range of Motion   The patient has normal left hip ROM.    Muscle Strength   The patient has normal left hip strength.     Tests   RICKY: negative  Fadir:  Negative FADIR test    Other   Erythema: absent  Sensation: normal  Pulse: present      Back Exam     Comments:  Nontender along lumbar spine.  Mild stiffness in left and right lateral rotation and left and right lateral bending.  Mild stiffness in flexion and extension.  Extension does seem to make his pain worse.                  Us Venous Doppler Lower Extremity Left (duplex)    Result Date: 6/26/2020  Narrative: EXAM DESCRIPTION: US VENOUS DOPPLER LOWER EXTREMITY LEFT (DUPLEX) PROCEDURE: Left Lower Extremity Venous Duplex COMPARISON: 7/13/2018 HISTORY: Left lower extremity pain and edema. FINDINGS: Realtime grayscale and color-flow imaging were performed of the left lower extremity. There is redemonstration of nonocclusive thrombus within the popliteal vein at the same location as that seen on the exam from 7/13/2018. This may represent residual chronic adherent thrombus. The left common femoral vein, profunda femoral vein, and femoral vein have  normal venous flow on duplex and color Doppler. The veins augment well compress normally, and appear to be patent with no filling defects visible on color Doppler or grayscale imaging. The infrapopliteal veins also appear patent with no evidence of thrombus. The greater saphenous vein is patent.     Impression: Similar to the study of 7/13/2018 is small focus of nonocclusive thrombus within the popliteal vein, favoring that this is a chronic finding. Otherwise unremarkable left lower extremity venous ultrasound. Electronically signed by:  Speedy Carlton MD  6/26/2020 2:24 PM CDT Workstation: [id number]    Xr Spine Lumbar Complete 4+vw    Result Date: 6/26/2020  Narrative: LUMBAR SPINE SERIES INDICATION FOR PROCEDURE:  Right hip pain, low back pain, M25.551 Pain in right hip. COMPARISON:  7/11/2013 TECHNIQUE:  AP, lateral, bilateral oblique and cone-down radiographs of the lumbar spine are submitted for interpretation. FINDINGS:  There are five lumbar type segments with mild levocurvature. Redemonstration of mild grade 1 degenerative listhesis at L4-5. No compression fracture or acute subluxation deformity. There is loss of disc space height greatest at T12-L1 with mild changes at L1-L2 and L4-L5. There is endplate osteophytosis at all levels. Facet arthropathy is seen greatest at the L3/4-L5/S1 levels. No pars defect identified. The pedicles appear intact.     Impression: Multilevel degenerative changes. No acute skeletal finding. If signs and symptoms of radiculopathy persist and/or progresses consider MRI imaging in follow-up. Electronically signed by:  Speedy Carlton MD  6/26/2020 1:58 PM CDT Workstation: [id number]    Xr Hip With Or Without Pelvis 2 - 3 View Right    Result Date: 6/26/2020  Narrative: EXAM DESCRIPTION: X-RAY RIGHT HIP TO INCLUDE AP PELVIS CLINICAL HISTORY: Right hip and low back pain . COMPARISON: 7/11/2013. FINDINGS: Frontal and frog-leg views of the hip demonstrates normal alignment. The articular  surfaces are smooth. No fracture, dislocation, or suspicious osseous lesion. AP projection of the pelvis demonstrates normal mineralization. The hips are intact. No widening of the sacroiliac joints or the symphysis. No acute fracture or suspicious osseous lesion. Redemonstration of phlebolith-like calcifications within the soft tissue pelvis.     Impression: No evidence of acute fracture or dislocation. Electronically signed by:  Speedy Carlton MD  6/26/2020 1:54 PM CDT Workstation: [id number]          ASSESSMENT:    Diagnoses and all orders for this visit:    Lumbar back pain with radiculopathy affecting right lower extremity  -     MRI Lumbar Spine Without Contrast; Future    Right hip pain  -     MRI Lumbar Spine Without Contrast; Future    Essential hypertension    History of DVT in adulthood    Chronic anticoagulation  -     MRI Lumbar Spine Without Contrast; Future          PLAN    He is complaining of right hip pain.  However, I think his symptoms are more related to radiculopathy.  His pain seems to be more buttock and down the back of his leg.  He has nontender over the greater trochanter.  He has good hip range of motion and good internal and external rotation of his hip.  His x-rays show diffuse arthritic change in his lower back.  The plan is to proceed with an MRI of his lumbar spine to assess for source of radiculopathy.  Follow-up after the MRI to determine further treatment options and potential referral to neurosurgery.    Patient's Body mass index is 36.88 kg/m². BMI is above normal parameters. Recommendations include: exercise counseling and nutrition counseling.      Return for recheck for MRI results.    Joseph Eddy MD

## 2020-07-17 NOTE — PROGRESS NOTES
Subjective   Patient ID: Josh Pérez is a 65 y.o. male is being seen in follow up.    Chief Complaint:    Chief Complaint   Patient presents with   • Deep Vein Thrombosis       The following portions of the patient's history were reviewed and updated as appropriate: allergies, current medications, past family history, past medical history, past social history, past surgical history and problem list.  Recent images independently reviewed.  Available laboratory values reviewed.    PCP:  Ian Hastings APRN    65 y.o. male with complaints of LLE Swelling x 8-10 months, conservative treatment, diuretics. Evaluated by orthopedics for LEFT knee pain. Further workup demonstrated +thrombus LEFT popliteal vein (2018). Anticoagulation was initiated Eliquis. Thrombus resolved. Eliquis continued x 3 months. Strong family history heart disease (death ages 62,66). Reports dyspnea on exertion, occasional palpitation.  No TIA stroke amaurosis.  No MI claudication. No other associated signs, symptoms or modifying factors. Occasional LLE pain, no acute swelling. Presented for new PCP eval. Duplex completed demonstrated + chronic popliteal thrombus LLE.    7/13/18: LLE Venous Duplex ([facility]): +Nonocclusive thrombus popliteal vein  8/14/18: LLE Venous Duplex ([facility]): Negative DVT  6/26/2020: LLE Venous duplex ([facility]): + Nonocclusive thrombus popliteal vein chronic    Past Medical History:   Diagnosis Date   • Benign essential HTN 10/22/2018     Past Surgical History:   Procedure Laterality Date   • COLONOSCOPY N/A 3/15/2017    Procedure: COLONOSCOPY;  Surgeon: Joseph Bustos DO;  Location: [facility] ENDOSCOPY;  Service:        ALLERGIES:   Penicillins    MEDICATIONS:  Current outpatient and discharge medications have been reconciled for the patient.  Reviewed by: HELEN Meza     Current Outpatient Medications:   •  aspirin (aspirin) 81 MG EC tablet, Take 1 tablet by mouth Daily., Disp: 30 tablet, Rfl: 5  •  fluticasone  (Flonase) 50 MCG/ACT nasal spray, 2 sprays into the nostril(s) as directed by provider Daily., Disp: 18.2 mL, Rfl: 3  •  hydroCHLOROthiazide (HYDRODIURIL) 12.5 MG tablet, Take 1 tablet by mouth Daily., Disp: 30 tablet, Rfl: 5  •  loratadine (Claritin) 5 MG/5ML syrup, Take 10 mL by mouth Daily., Disp: 180 mL, Rfl: 12  •  sildenafil (Viagra) 100 MG tablet, Take 1 tablet by mouth As Needed for Erectile Dysfunction., Disp: 30 tablet, Rfl: 3  •  apixaban (ELIQUIS) 5 MG tablet tablet, Take 1 tablet by mouth Every 12 (Twelve) Hours. Indications: Other - full anticoagulation, Disp: 180 tablet, Rfl: 3  •  atorvastatin (Lipitor) 20 MG tablet, Take 1 tablet by mouth Every Night., Disp: 30 tablet, Rfl: 3    Review of Systems   HENT: Negative for nosebleeds.    Eyes: Negative for visual disturbance.   Cardiovascular: Negative for claudication, cyanosis, leg swelling and palpitations.   Respiratory: Positive for shortness of breath. Negative for hemoptysis.    Hematologic/Lymphatic: Negative for bleeding problem. Does not bruise/bleed easily.   Skin: Negative for color change and nail changes.   Musculoskeletal: Positive for joint pain. Negative for muscle weakness.   Gastrointestinal: Negative for dysphagia, hematemesis and melena.   Genitourinary: Negative for hematuria.   Neurological: Negative for dizziness, focal weakness, light-headedness, loss of balance, numbness, paresthesias and weakness.   Psychiatric/Behavioral: Negative for altered mental status.   All other systems reviewed and are negative.       Objective   Heart Rate:  [96] 96  BP: (118)/(72) 118/72   Vitals:    07/16/20 1309   BP: 118/72   Pulse: 96   SpO2: 99%      Body mass index is 37.19 kg/m².  Physical Exam   Constitutional: He is oriented to person, place, and time. He appears well-developed.   HENT:   Head: Normocephalic.   Eyes: EOM are normal.   Neck: Neck supple. Carotid bruit is not present.   Cardiovascular: Normal rate, regular rhythm and normal  heart sounds.   Pulses:       Dorsalis pedis pulses are 2+ on the right side, and 2+ on the left side.        Posterior tibial pulses are 2+ on the right side, and 2+ on the left side.   Pulmonary/Chest: Effort normal and breath sounds normal.   Abdominal: Soft. Bowel sounds are normal.   Musculoskeletal: Normal range of motion. He exhibits no edema.   Gait normal   Neurological: He is alert and oriented to person, place, and time. No cranial nerve deficit.   Skin: Skin is warm and dry. Capillary refill takes less than 2 seconds.   No venous staining   Psychiatric: He has a normal mood and affect. Thought content normal.   Vitals reviewed.    Lab Results   Component Value Date    WBC 6.58 06/26/2020    HGB 13.8 06/26/2020    HCT 40.4 06/26/2020    MCV 92.4 06/26/2020     06/26/2020     Lab Results   Component Value Date    GLUCOSE 89 06/26/2020    BUN 21 06/26/2020    CREATININE 0.90 06/26/2020    EGFRIFNONA 85 06/26/2020    BCR 23.3 06/26/2020    K 4.3 06/26/2020    CO2 27.0 06/26/2020    CALCIUM 9.6 06/26/2020    ALBUMIN 4.50 06/26/2020    AST 42 (H) 06/26/2020    ALT 34 06/26/2020       Assessment/Plan   Independent Review of Radiographic Studies:    Detailed discussion regarding risks, benefits, and treatment plan. Images independently reviewed. Patient understands, agrees, and wishes to proceed with plan.     1. Current use of long term anticoagulation  Expected Length of therapy: lifelong (Recurrent DVT)  Partial Hypercoagulable Panel - Negative  Return as needed- PCP may follow anticoagulation  - apixaban (ELIQUIS) 5 MG tablet tablet; Take 1 tablet by mouth Every 12 (Twelve) Hours. Indications: Other - full anticoagulation  Dispense: 180 tablet; Refill: 3    Notify provider if you experience excessive bleeding from the nose, cuts, gums, rectum, urinary tract, or vagina. Reddish or brown urine or stool. Vomiting of blood or hemorrhoidal bleeding. If major injury occurs present to the Emergency  Department.      2. Chronic deep vein thrombosis (DVT) of popliteal vein of left lower extremity (CMS/HCC)  Stable Chronic DVT- LEFT Lower Extremity  Anticoagulation: Eliquis  Compression if swelling    3. Benign essential HTN  Controlled.     4. Morbidly obese (CMS/HCC)  Your BMI is 37 and falls within the overweight range. BMI is strongly correlated with increased health risks. Review options for weight management, heart healthy diet, and exercise programs.            This document has been electronically signed by HELEN Meza on July 17, 2020 08:48

## 2020-08-03 ENCOUNTER — OFFICE VISIT (OUTPATIENT)
Dept: FAMILY MEDICINE CLINIC | Facility: CLINIC | Age: 65
End: 2020-08-03

## 2020-08-03 VITALS
DIASTOLIC BLOOD PRESSURE: 86 MMHG | HEIGHT: 70 IN | WEIGHT: 257.5 LBS | RESPIRATION RATE: 20 BRPM | HEART RATE: 105 BPM | BODY MASS INDEX: 36.86 KG/M2 | OXYGEN SATURATION: 99 % | SYSTOLIC BLOOD PRESSURE: 170 MMHG

## 2020-08-03 DIAGNOSIS — I82.532 CHRONIC DEEP VEIN THROMBOSIS (DVT) OF POPLITEAL VEIN OF LEFT LOWER EXTREMITY (HCC): ICD-10-CM

## 2020-08-03 DIAGNOSIS — M54.6 ACUTE RIGHT-SIDED THORACIC BACK PAIN: ICD-10-CM

## 2020-08-03 DIAGNOSIS — E78.2 MIXED HYPERLIPIDEMIA: ICD-10-CM

## 2020-08-03 DIAGNOSIS — R73.03 PREDIABETES: ICD-10-CM

## 2020-08-03 DIAGNOSIS — E66.01 CLASS 2 SEVERE OBESITY DUE TO EXCESS CALORIES WITH SERIOUS COMORBIDITY AND BODY MASS INDEX (BMI) OF 36.0 TO 36.9 IN ADULT (HCC): ICD-10-CM

## 2020-08-03 DIAGNOSIS — I10 BENIGN ESSENTIAL HTN: Primary | ICD-10-CM

## 2020-08-03 PROCEDURE — 99214 OFFICE O/P EST MOD 30 MIN: CPT | Performed by: NURSE PRACTITIONER

## 2020-08-03 NOTE — PROGRESS NOTES
Subjective   Josh Pérez is a 65 y.o. male.     Mr. Pérez is a 65-year-old male who presents today for follow-up related to hypertension, chronic DVT, obesity, hyperlipidemia, prediabetes and evaluation of right thoracic back pain.  Blood pressure today was 170/86 after coming up 5 flights of stairs.  After 10 minutes of rest his blood pressure was 138/82.  He denies chest pain, shortness of breath, palpitations or edema.  Patient is tolerating Eliquis and aspirin well with no excessive bruising or other signs of excessive anticoagulation.  Patient has reevaluated his diet and is making more low-carb and low-fat choices.  He has lost 5 pounds since his last appointment.  He denies any symptoms of hyper or hypoglycemia.  Patient does report recurrent and intermittent right thoracic back pain.  He denies any trauma, rash, burning, tingling.       The following portions of the patient's history were reviewed and updated as appropriate: allergies, current medications, past family history, past medical history, past social history, past surgical history and problem list.    Review of Systems   Constitutional: Negative for activity change, appetite change, fatigue, unexpected weight gain and unexpected weight loss.   HENT: Negative for congestion, sore throat, trouble swallowing and voice change.    Eyes: Negative.    Respiratory: Negative for cough, chest tightness, shortness of breath and wheezing.    Cardiovascular: Negative for chest pain, palpitations and leg swelling.   Gastrointestinal: Negative for abdominal pain, diarrhea, nausea and vomiting.   Endocrine: Negative.  Negative for cold intolerance, heat intolerance, polydipsia, polyphagia and polyuria.   Genitourinary: Negative for dysuria, hematuria and urgency.   Musculoskeletal: Positive for back pain (right thoracic). Negative for arthralgias and myalgias.   Skin: Negative for rash.   Neurological: Negative for dizziness, weakness, light-headedness and  headache.   Hematological: Negative.    Psychiatric/Behavioral: Negative.        Objective   Physical Exam   Constitutional: He is oriented to person, place, and time. He appears well-developed and well-nourished. No distress.   HENT:   Head: Normocephalic and atraumatic.   Eyes: Conjunctivae are normal.   Neck: Normal range of motion.   Cardiovascular: Normal rate, regular rhythm, normal heart sounds and intact distal pulses. Exam reveals no gallop and no friction rub.   No murmur heard.  Pulmonary/Chest: Effort normal and breath sounds normal. No stridor. No respiratory distress. He has no wheezes. He has no rales.   Abdominal: Soft. Bowel sounds are normal. He exhibits no distension and no mass. There is no tenderness. There is no rebound and no guarding. No hernia.   Musculoskeletal: Normal range of motion. He exhibits no edema.        Thoracic back: He exhibits tenderness, bony tenderness and pain. He exhibits normal range of motion, no swelling, no edema, no deformity and no spasm.   Neurological: He is alert and oriented to person, place, and time.   Skin: Skin is warm and dry. No rash noted. He is not diaphoretic. No erythema. No pallor.   Psychiatric: He has a normal mood and affect. His behavior is normal. Judgment and thought content normal.   Nursing note and vitals reviewed.        Assessment/Plan   Josh was seen today for follow-up.    Diagnoses and all orders for this visit:    Benign essential HTN    Chronic deep vein thrombosis (DVT) of popliteal vein of left lower extremity (CMS/[facility])    Class 2 severe obesity due to excess calories with serious comorbidity and body mass index (BMI) of 36.0 to 36.9 in adult (CMS/HCC)    Mixed hyperlipidemia    Prediabetes    Acute right-sided thoracic back pain  -     XR Spine Thoracic 3 View; Future    1.  Benign essential hypertension- controlled.  No change in therapy at this time.  We will continue to monitor.    2.  Chronic DVT of popliteal vein of left lower  extremity- tolerating anticoagulant well with no signs of excessive anticoagulation.  Continue follow-up with cardiovascular surgery.    3.  Class II severe obesity due to excess calories with serious comorbidity with a BMI of 36.9- congratulated patient on recent 5 pound weight loss.  Encouraged patient to continue low-carb low-fat diet with routine exercise 3-5 times a week for least 30 minutes.  We will continue to monitor.    4.  Mixed hyperlipidemia- continue diet and exercise.  We will continue to monitor.    5.  Prediabetes- continue low-carb diet and exercise.  We will continue to monitor.    6.  Acute right-sided thoracic back pain- x-ray thoracic spine.  Will call with results.    7.  Follow-up in 3 months or sooner if needed.            This document has been electronically signed by HELEN Rosenbaum on August 3, 2020 17:04

## 2020-08-04 ENCOUNTER — TELEPHONE (OUTPATIENT)
Dept: ORTHOPEDIC SURGERY | Facility: CLINIC | Age: 65
End: 2020-08-04

## 2020-08-04 DIAGNOSIS — M25.551 RIGHT HIP PAIN: ICD-10-CM

## 2020-08-04 DIAGNOSIS — Z79.01 CHRONIC ANTICOAGULATION: ICD-10-CM

## 2020-08-04 DIAGNOSIS — M54.16 LUMBAR BACK PAIN WITH RADICULOPATHY AFFECTING RIGHT LOWER EXTREMITY: ICD-10-CM

## 2020-08-04 NOTE — TELEPHONE ENCOUNTER
----- Message from Joseph Eddy MD sent at 8/4/2020 12:57 PM CDT -----  Results noted.  Does have arthritic change in lumbar spine and looks to have compression of nerve root on right.  Lets try PT for Low back exercises, traction, HEP  Make appointment to see me in 4 weeks unless the PT makes it worse and then we can see about getting him to see a neurosurgeon to discuss further treatment options.  Thanks  NIKOLE

## 2020-08-04 NOTE — TELEPHONE ENCOUNTER
Called and spoke with patient, requesting order faxed to [facility], made f/u for 9/3/2020/

## 2020-08-07 ENCOUNTER — TELEPHONE (OUTPATIENT)
Dept: FAMILY MEDICINE CLINIC | Facility: CLINIC | Age: 65
End: 2020-08-07

## 2020-08-07 NOTE — TELEPHONE ENCOUNTER
Patient was called;  No answer.  A message was left to let him know that PCP will place a referral to PT if he would like.         ----- Message from HELEN Rosenbaum sent at 8/6/2020  2:43 PM CDT -----  Regarding: RE: back pain  If he is interested in physical therapy I can place a referral.  Please let me know what his decision is.  ----- Message -----  From: Eliz Bhatt MA  Sent: 8/5/2020   2:49 PM CDT  To: HELEN Rosenbaum  Subject: back pain                                        [place],    Patient states he has constant back pain; some days worse than others.  He prefers to not take pain medication or surgery.  He wants to know if there is something else.

## 2020-08-10 ENCOUNTER — TELEPHONE (OUTPATIENT)
Dept: FAMILY MEDICINE CLINIC | Facility: CLINIC | Age: 65
End: 2020-08-10

## 2020-09-01 ENCOUNTER — TELEPHONE (OUTPATIENT)
Dept: ORTHOPEDIC SURGERY | Facility: CLINIC | Age: 65
End: 2020-09-01

## 2020-09-01 NOTE — TELEPHONE ENCOUNTER
Surjit    Patient has not had therapy yet because he has been down and in a lot of pain. I moved his appointment out till first of October.  He wants to be referred to [facility] physical therapy instead though.

## 2020-11-05 ENCOUNTER — OFFICE VISIT (OUTPATIENT)
Dept: ORTHOPEDIC SURGERY | Facility: CLINIC | Age: 65
End: 2020-11-05

## 2020-11-05 VITALS — HEIGHT: 70 IN | WEIGHT: 251 LBS | BODY MASS INDEX: 35.93 KG/M2

## 2020-11-05 DIAGNOSIS — M54.16 LUMBAR BACK PAIN WITH RADICULOPATHY AFFECTING RIGHT LOWER EXTREMITY: Primary | ICD-10-CM

## 2020-11-05 DIAGNOSIS — Z79.01 CHRONIC ANTICOAGULATION: ICD-10-CM

## 2020-11-05 DIAGNOSIS — M25.551 RIGHT HIP PAIN: ICD-10-CM

## 2020-11-05 DIAGNOSIS — I10 ESSENTIAL HYPERTENSION: ICD-10-CM

## 2020-11-05 DIAGNOSIS — Z86.718 HISTORY OF DVT IN ADULTHOOD: ICD-10-CM

## 2020-11-05 PROCEDURE — 99213 OFFICE O/P EST LOW 20 MIN: CPT | Performed by: ORTHOPAEDIC SURGERY

## 2020-11-05 NOTE — PROGRESS NOTES
"Josh Pérez is a 65 y.o. male returns for     Chief Complaint   Patient presents with   • Lumbar Spine - Follow-up   • Right Hip - Follow-up        HISTORY OF PRESENT ILLNESS: Follow up on lumbar spine and right hip. Pain level of 5/10.  Has been seeing pt for about 3 weeks.  Slowly getting better.  Endurance is improving.  Worse at the end of the day.  Numbness and tingling is a little improved.     CONCURRENT MEDICAL HISTORY:    The following portions of the patient's history were reviewed and updated as appropriate: allergies, current medications, past family history, past medical history, past social history, past surgical history and problem list.     ROS  No fevers or chills.  No chest pain or shortness of air.  No GI or  disturbances.    PHYSICAL EXAMINATION:       Ht 177.8 cm (70\")   Wt 114 kg (251 lb)   BMI 36.01 kg/m²     Physical Exam  Constitutional:       General: He is not in acute distress.     Appearance: Normal appearance. He is well-developed. He is not ill-appearing.   Pulmonary:      Effort: Pulmonary effort is normal. No respiratory distress.   Neurological:      Mental Status: He is alert and oriented to person, place, and time.   Psychiatric:         Mood and Affect: Mood normal.         Behavior: Behavior normal.         Thought Content: Thought content normal.         Judgment: Judgment normal.         GAIT:     [x]  Normal  []  Antalgic    Assistive device: [x]  None  []  Walker     []  Crutches  []  Cane     []  Wheelchair  []  Stretcher    Right Hip Exam     Tenderness   The patient is experiencing no tenderness.     Range of Motion   Flexion: normal   External rotation: normal   Internal rotation: normal     Muscle Strength   The patient has normal right hip strength.    Tests   RICKY: negative  Fadir:  Negative FADIR test    Other   Erythema: absent  Sensation: normal  Pulse: present    Comments:  Mildly positive straight leg raise      Left Hip Exam     Tenderness   The patient " is experiencing no tenderness.     Range of Motion   The patient has normal left hip ROM.    Muscle Strength   The patient has normal left hip strength.     Tests   RICKY: negative  Fadir:  Negative FADIR test    Other   Erythema: absent  Sensation: normal  Pulse: present      Back Exam     Comments:  Nontender along lumbar spine.  Mild stiffness in left and right lateral rotation and left and right lateral bending.  Mild stiffness in flexion and extension.  Extension does seem to make his pain worse.                        ASSESSMENT:    Diagnoses and all orders for this visit:    Lumbar back pain with radiculopathy affecting right lower extremity    Right hip pain    Chronic anticoagulation    Essential hypertension    History of DVT in adulthood          PLAN    Discussed the possibility of epidural injections, neurosurgery.  Will continue with PT and HEP for right now.  Slowly progress as tolerated.  To new general strength and conditioning exercises.  He was instructed to notify us for weakness, progressive numbness or tingling, or progressively worsening pain.        Body mass index is 36.01 kg/m².    Return in about 6 weeks (around 12/17/2020) for recheck.    Joseph Eddy MD

## 2020-11-30 ENCOUNTER — TELEMEDICINE (OUTPATIENT)
Dept: FAMILY MEDICINE CLINIC | Facility: CLINIC | Age: 65
End: 2020-11-30

## 2020-11-30 DIAGNOSIS — E78.2 MIXED HYPERLIPIDEMIA: ICD-10-CM

## 2020-11-30 DIAGNOSIS — R73.03 PREDIABETES: ICD-10-CM

## 2020-11-30 DIAGNOSIS — I10 BENIGN ESSENTIAL HTN: Primary | ICD-10-CM

## 2020-11-30 DIAGNOSIS — I82.532 CHRONIC DEEP VEIN THROMBOSIS (DVT) OF POPLITEAL VEIN OF LEFT LOWER EXTREMITY (HCC): ICD-10-CM

## 2020-11-30 PROCEDURE — 99214 OFFICE O/P EST MOD 30 MIN: CPT | Performed by: NURSE PRACTITIONER

## 2020-11-30 NOTE — PROGRESS NOTES
Subjective   Josh Pérez is a 65 y.o. male.     Hypertension  This is a chronic problem. The current episode started more than 1 year ago. The problem has been gradually improving since onset. The problem is controlled. Pertinent negatives include no chest pain, palpitations or shortness of breath. Risk factors for coronary artery disease include family history, dyslipidemia and male gender. Current antihypertension treatment includes diuretics. The current treatment provides significant improvement. There are no compliance problems.    Hyperlipidemia  This is a chronic problem. The current episode started more than 1 year ago. The problem is controlled. Recent lipid tests were reviewed and are variable. Exacerbating diseases include diabetes. Factors aggravating his hyperlipidemia include fatty foods. Pertinent negatives include no chest pain, focal sensory loss, focal weakness, leg pain, myalgias or shortness of breath. Current antihyperlipidemic treatment includes statins and diet change. The current treatment provides significant improvement of lipids. There are no compliance problems.    Blood Sugar Problem  This is a chronic problem. The current episode started more than 1 month ago. The problem occurs rarely. The problem has been gradually improving. Pertinent negatives include no abdominal pain, arthralgias, chest pain, congestion, coughing, fatigue, myalgias, nausea, rash, sore throat, vomiting or weakness.        The following portions of the patient's history were reviewed and updated as appropriate: allergies, current medications, past family history, past medical history, past social history, past surgical history and problem list.    Review of Systems   Constitutional: Negative for activity change, appetite change, fatigue, unexpected weight gain and unexpected weight loss.   HENT: Negative for congestion, sore throat, trouble swallowing and voice change.    Eyes: Negative.    Respiratory: Negative  for cough, chest tightness, shortness of breath and wheezing.    Cardiovascular: Negative for chest pain, palpitations and leg swelling.        Reports continued fullness/tight sensation in left leg with chronic DVT. Patient taking blood thinner as prescribed.    Gastrointestinal: Negative for abdominal pain, diarrhea, nausea and vomiting.   Endocrine: Negative.    Genitourinary: Negative for dysuria, hematuria and urgency.   Musculoskeletal: Negative for arthralgias and myalgias.   Skin: Negative for rash.   Neurological: Negative for dizziness, focal weakness, weakness, light-headedness and headache.   Hematological: Negative.    Psychiatric/Behavioral: Negative.        Objective   Physical Exam  Constitutional:       General: He is not in acute distress.     Appearance: He is well-developed.      Comments: Physical exam limited due to limitations of telehealth.    Pulmonary:      Effort: Pulmonary effort is normal. No respiratory distress.   Neurological:      Mental Status: He is alert and oriented to person, place, and time.   Psychiatric:         Mood and Affect: Mood normal.         Behavior: Behavior normal.         Thought Content: Thought content normal.         Judgment: Judgment normal.           Assessment/Plan   Diagnoses and all orders for this visit:    1. Benign essential HTN (Primary)   -controlled. Will continue to monitor.    2. Mixed hyperlipidemia  -     Lipid Panel; Future, will call with results.continue low fat diet and medication as prescribed.     3. Prediabetes  -     Comprehensive Metabolic Panel; Future  -     Hemoglobin A1c; Future   -will call with results.continue low carb diet.     4. Chronic deep vein thrombosis (DVT) of popliteal vein of left lower extremity (CMS/HCC)  -     US Venous Doppler Lower Extremity Left (duplex); Future, will call with results. Instructed patient to call cardiovascular surgery for follow up.     5. Follow up in 6 months or sooner if needed.              This document has been electronically signed by HELEN Rosenbaum on November 30, 2020 17:16 CST  This was an audio and video enabled telemedicine encounter. The time that was spent in reviewing the patient's chart and addressing their symptoms, diagnosis and treatment was 21 mins.

## 2020-12-02 ENCOUNTER — LAB (OUTPATIENT)
Dept: LAB | Facility: HOSPITAL | Age: 65
End: 2020-12-02

## 2020-12-02 DIAGNOSIS — R73.03 PREDIABETES: ICD-10-CM

## 2020-12-02 DIAGNOSIS — E78.2 MIXED HYPERLIPIDEMIA: ICD-10-CM

## 2020-12-02 LAB
ALBUMIN SERPL-MCNC: 4.4 G/DL (ref 3.5–5.2)
ALBUMIN/GLOB SERPL: 1.5 G/DL
ALP SERPL-CCNC: 54 U/L (ref 39–117)
ALT SERPL W P-5'-P-CCNC: 27 U/L (ref 1–41)
ANION GAP SERPL CALCULATED.3IONS-SCNC: 4.2 MMOL/L (ref 5–15)
AST SERPL-CCNC: 33 U/L (ref 1–40)
BILIRUB SERPL-MCNC: 0.3 MG/DL (ref 0–1.2)
BUN SERPL-MCNC: 15 MG/DL (ref 8–23)
BUN/CREAT SERPL: 16.9 (ref 7–25)
CALCIUM SPEC-SCNC: 9.5 MG/DL (ref 8.6–10.5)
CHLORIDE SERPL-SCNC: 103 MMOL/L (ref 98–107)
CHOLEST SERPL-MCNC: 160 MG/DL (ref 0–200)
CO2 SERPL-SCNC: 29.8 MMOL/L (ref 22–29)
CREAT SERPL-MCNC: 0.89 MG/DL (ref 0.76–1.27)
GFR SERPL CREATININE-BSD FRML MDRD: 86 ML/MIN/1.73
GLOBULIN UR ELPH-MCNC: 2.9 GM/DL
GLUCOSE SERPL-MCNC: 109 MG/DL (ref 65–99)
HBA1C MFR BLD: 6 % (ref 4.8–5.6)
HDLC SERPL-MCNC: 43 MG/DL (ref 40–60)
LDLC SERPL CALC-MCNC: 100 MG/DL (ref 0–100)
LDLC/HDLC SERPL: 2.31 {RATIO}
POTASSIUM SERPL-SCNC: 4.6 MMOL/L (ref 3.5–5.2)
PROT SERPL-MCNC: 7.3 G/DL (ref 6–8.5)
SODIUM SERPL-SCNC: 137 MMOL/L (ref 136–145)
TRIGL SERPL-MCNC: 88 MG/DL (ref 0–150)
VLDLC SERPL-MCNC: 17 MG/DL (ref 5–40)

## 2020-12-02 PROCEDURE — 80061 LIPID PANEL: CPT

## 2020-12-02 PROCEDURE — 80053 COMPREHEN METABOLIC PANEL: CPT

## 2020-12-02 PROCEDURE — 36415 COLL VENOUS BLD VENIPUNCTURE: CPT

## 2020-12-02 PROCEDURE — 83036 HEMOGLOBIN GLYCOSYLATED A1C: CPT

## 2020-12-03 ENCOUNTER — APPOINTMENT (OUTPATIENT)
Dept: ULTRASOUND IMAGING | Facility: HOSPITAL | Age: 65
End: 2020-12-03

## 2020-12-03 NOTE — PROGRESS NOTES
Cholesterol improved to WNL. HgbA1c improved to 6.0. continue low carb and low fat diet. Follow up as scheduled.

## 2020-12-18 ENCOUNTER — TELEPHONE (OUTPATIENT)
Dept: FAMILY MEDICINE CLINIC | Facility: CLINIC | Age: 65
End: 2020-12-18

## 2020-12-18 NOTE — TELEPHONE ENCOUNTER
I would have him contact [facility] in regards to how long he needs to be off his blood thinner and when he needs to resume it.

## 2020-12-18 NOTE — TELEPHONE ENCOUNTER
Patient was called to let him know to call [facility]le [facility] regarding his Eliquis and how long to not take prior to knee surgery.

## 2020-12-18 NOTE — TELEPHONE ENCOUNTER
patint calling asking if its ok to be off eliquis for 2 days before his knee surgery  12-23-20    Please call him    [phone]

## 2020-12-21 ENCOUNTER — TELEPHONE (OUTPATIENT)
Dept: CARDIAC SURGERY | Facility: CLINIC | Age: 65
End: 2020-12-21

## 2020-12-21 NOTE — TELEPHONE ENCOUNTER
Pt verbalized understanding      ----- Message from HELEN Barrett sent at 12/21/2020 11:13 AM CST -----  Hold two to three days prior to surgery.  I attempted to call the number and no answer   ----- Message -----  From: Ava Arenas MA  Sent: 12/21/2020   9:14 AM CST  To: HELEN Barrett      ----- Message -----  From: Breanne Davis  Sent: 12/21/2020   9:10 AM CST  To: [facility]    Danielle Cast pt       Pt is in [facility] has had a knee injury and looking at surgery on Wednesday of this week.  The surgeon there told him to call and get a verbal as to  how long he needs to be off of eliquis prior to surgery.      Call him at [phone]    Edin Raymundo

## 2020-12-22 ENCOUNTER — TELEPHONE (OUTPATIENT)
Dept: FAMILY MEDICINE CLINIC | Facility: CLINIC | Age: 65
End: 2020-12-22

## 2020-12-22 DIAGNOSIS — Z79.01 CURRENT USE OF LONG TERM ANTICOAGULATION: ICD-10-CM

## 2020-12-22 NOTE — TELEPHONE ENCOUNTER
Needs refill on    fluticasone (Flonase) 50 MCG/ACT nasal spray      hydroCHLOROthiazide (HYDRODIURIL) 12.5 MG tablet      [facility] - [place], [place] - [address] - [phone] [facility] [phone] FX

## 2020-12-28 ENCOUNTER — APPOINTMENT (OUTPATIENT)
Dept: ULTRASOUND IMAGING | Facility: HOSPITAL | Age: 65
End: 2020-12-28

## 2020-12-28 RX ORDER — HYDROCHLOROTHIAZIDE 12.5 MG/1
12.5 TABLET ORAL DAILY
Qty: 30 TABLET | Refills: 5 | Status: SHIPPED | OUTPATIENT
Start: 2020-12-28 | End: 2021-08-24

## 2020-12-28 RX ORDER — FLUTICASONE PROPIONATE 50 MCG
2 SPRAY, SUSPENSION (ML) NASAL DAILY
Qty: 18.2 ML | Refills: 3 | Status: SHIPPED | OUTPATIENT
Start: 2020-12-28

## 2021-02-12 DIAGNOSIS — Z79.01 CURRENT USE OF LONG TERM ANTICOAGULATION: ICD-10-CM

## 2021-03-02 ENCOUNTER — IMMUNIZATION (OUTPATIENT)
Dept: VACCINE CLINIC | Facility: HOSPITAL | Age: 66
End: 2021-03-02

## 2021-03-02 PROCEDURE — 0001A: CPT | Performed by: NURSE PRACTITIONER

## 2021-03-02 PROCEDURE — 91300 HC SARSCOV02 VAC 30MCG/0.3ML IM: CPT | Performed by: NURSE PRACTITIONER

## 2021-03-05 ENCOUNTER — LAB (OUTPATIENT)
Dept: LAB | Facility: HOSPITAL | Age: 66
End: 2021-03-05

## 2021-03-05 DIAGNOSIS — Z01.818 PREOP TESTING: Primary | ICD-10-CM

## 2021-03-05 PROCEDURE — U0004 COV-19 TEST NON-CDC HGH THRU: HCPCS

## 2021-03-05 PROCEDURE — C9803 HOPD COVID-19 SPEC COLLECT: HCPCS

## 2021-03-06 LAB — SARS-COV-2 ORF1AB RESP QL NAA+PROBE: NOT DETECTED

## 2021-03-23 ENCOUNTER — IMMUNIZATION (OUTPATIENT)
Dept: VACCINE CLINIC | Facility: HOSPITAL | Age: 66
End: 2021-03-23

## 2021-03-23 PROCEDURE — 0002A: CPT | Performed by: THORACIC SURGERY (CARDIOTHORACIC VASCULAR SURGERY)

## 2021-03-23 PROCEDURE — 91300 HC SARSCOV02 VAC 30MCG/0.3ML IM: CPT | Performed by: THORACIC SURGERY (CARDIOTHORACIC VASCULAR SURGERY)

## 2021-04-21 ENCOUNTER — LAB (OUTPATIENT)
Dept: LAB | Facility: HOSPITAL | Age: 66
End: 2021-04-21

## 2021-04-21 DIAGNOSIS — Z01.818 PREOP TESTING: ICD-10-CM

## 2021-04-21 LAB — SARS-COV-2 N GENE RESP QL NAA+PROBE: NOT DETECTED

## 2021-04-21 PROCEDURE — 87635 SARS-COV-2 COVID-19 AMP PRB: CPT

## 2021-04-21 PROCEDURE — C9803 HOPD COVID-19 SPEC COLLECT: HCPCS

## 2021-04-23 ENCOUNTER — HOSPITAL ENCOUNTER (OUTPATIENT)
Facility: HOSPITAL | Age: 66
Setting detail: HOSPITAL OUTPATIENT SURGERY
Discharge: HOME | End: 2021-04-23
Attending: INTERNAL MEDICINE | Admitting: INTERNAL MEDICINE

## 2021-04-23 ENCOUNTER — ANESTHESIA EVENT (OUTPATIENT)
Dept: GASTROENTEROLOGY | Facility: HOSPITAL | Age: 66
End: 2021-04-23

## 2021-04-23 ENCOUNTER — ANESTHESIA (OUTPATIENT)
Dept: GASTROENTEROLOGY | Facility: HOSPITAL | Age: 66
End: 2021-04-23

## 2021-04-23 VITALS
SYSTOLIC BLOOD PRESSURE: 142 MMHG | BODY MASS INDEX: 37.45 KG/M2 | TEMPERATURE: 97 F | DIASTOLIC BLOOD PRESSURE: 72 MMHG | RESPIRATION RATE: 20 BRPM | OXYGEN SATURATION: 99 % | HEIGHT: 66 IN | WEIGHT: 233 LBS | HEART RATE: 80 BPM

## 2021-04-23 DIAGNOSIS — Z86.010 HISTORY OF COLONIC POLYPS: ICD-10-CM

## 2021-04-23 PROCEDURE — 25010000002 PROPOFOL 10 MG/ML EMULSION: Performed by: NURSE ANESTHETIST, CERTIFIED REGISTERED

## 2021-04-23 PROCEDURE — 88305 TISSUE EXAM BY PATHOLOGIST: CPT

## 2021-04-23 RX ORDER — DEXTROSE AND SODIUM CHLORIDE 5; .45 G/100ML; G/100ML
30 INJECTION, SOLUTION INTRAVENOUS CONTINUOUS PRN
Status: DISCONTINUED | OUTPATIENT
Start: 2021-04-23 | End: 2021-04-23 | Stop reason: HOSPADM

## 2021-04-23 RX ORDER — LIDOCAINE HYDROCHLORIDE 20 MG/ML
INJECTION, SOLUTION INTRAVENOUS AS NEEDED
Status: DISCONTINUED | OUTPATIENT
Start: 2021-04-23 | End: 2021-04-23 | Stop reason: SURG

## 2021-04-23 RX ORDER — ONDANSETRON 2 MG/ML
4 INJECTION INTRAMUSCULAR; INTRAVENOUS ONCE AS NEEDED
Status: DISCONTINUED | OUTPATIENT
Start: 2021-04-23 | End: 2021-04-23 | Stop reason: HOSPADM

## 2021-04-23 RX ORDER — PROPOFOL 10 MG/ML
VIAL (ML) INTRAVENOUS AS NEEDED
Status: DISCONTINUED | OUTPATIENT
Start: 2021-04-23 | End: 2021-04-23 | Stop reason: SURG

## 2021-04-23 RX ORDER — PROMETHAZINE HYDROCHLORIDE 25 MG/1
25 TABLET ORAL ONCE AS NEEDED
Status: DISCONTINUED | OUTPATIENT
Start: 2021-04-23 | End: 2021-04-23 | Stop reason: HOSPADM

## 2021-04-23 RX ORDER — PROMETHAZINE HYDROCHLORIDE 25 MG/1
25 SUPPOSITORY RECTAL ONCE AS NEEDED
Status: DISCONTINUED | OUTPATIENT
Start: 2021-04-23 | End: 2021-04-23 | Stop reason: HOSPADM

## 2021-04-23 RX ADMIN — PROPOFOL 50 MG: 10 INJECTION, EMULSION INTRAVENOUS at 13:46

## 2021-04-23 RX ADMIN — LIDOCAINE HYDROCHLORIDE 100 MG: 20 INJECTION, SOLUTION INTRAVENOUS at 13:40

## 2021-04-23 RX ADMIN — PROPOFOL 100 MG: 10 INJECTION, EMULSION INTRAVENOUS at 13:40

## 2021-04-23 RX ADMIN — DEXTROSE AND SODIUM CHLORIDE 30 ML/HR: 5; 450 INJECTION, SOLUTION INTRAVENOUS at 13:28

## 2021-04-23 NOTE — ANESTHESIA PREPROCEDURE EVALUATION
Anesthesia Evaluation     NPO Solid Status: > 8 hours  NPO Liquid Status: > 6 hours           Airway   Dental    (+) poor dentition    Pulmonary - normal exam   (+) shortness of breath,   Cardiovascular - normal exam    (+) hypertension, DVT, hyperlipidemia,       Neuro/Psych  GI/Hepatic/Renal/Endo    (+) obesity, morbid obesity,      Musculoskeletal     Abdominal    Substance History      OB/GYN          Other                        Anesthesia Plan    ASA 3     MAC     intravenous induction     Anesthetic plan, all risks, benefits, and alternatives have been provided, discussed and informed consent has been obtained with: patient.

## 2021-04-23 NOTE — ANESTHESIA POSTPROCEDURE EVALUATION
Patient: Josh Pérez    Procedure Summary     Date: 04/23/21 Room / Location: [facility] ENDOSCOPY 2 / [facility] ENDOSCOPY    Anesthesia Start: 1334 Anesthesia Stop: 1351    Procedure: COLONOSCOPY (N/A ) Diagnosis:       History of colonic polyps      (History of colonic polyps [Z86.010])    Surgeons: Joseph Bustos DO Provider: Ricardo Jean Baptiste CRNA    Anesthesia Type: MAC ASA Status: 3          Anesthesia Type: MAC    Vitals  No vitals data found for the desired time range.          Post Anesthesia Care and Evaluation    Patient location during evaluation: bedside  Patient participation: complete - patient cannot participate  Level of consciousness: awake  Pain score: 0  Pain management: adequate  Airway patency: patent  Anesthetic complications: No anesthetic complications  PONV Status: none  Cardiovascular status: acceptable  Respiratory status: acceptable  Hydration status: acceptable

## 2021-04-23 NOTE — H&P
Rosalinda Dawson DO,[facility]  Gastroenterology  Hepatology  Endoscopy  Board Certified in Internal Medicine and gastroenterology  [address], [address]  [place]. [address]  T- [phone]   F - [phone]     GASTROENTEROLOGY HISTORY AND PHYSICAL  NOTE   ROSALINDA DAWSON DO.         SUBJECTIVE:   4/23/2021    Name: Josh Pérez  DOD: 1955        Chief Complaint:       Subjective : Personal history of colon polyps.  Last colonoscopy 2017    Patient is 66 y.o. male presents with desire for elective colonoscopy.      ROS/HISTORY/ CURRENT MEDICATIONS/OBJECTIVE/VS/PE:   Review of Systems:  All systems unremarkable unless specified below.  Constitutional   HENT  Eyes   Respiratory    Cardiovascular  Gastrointestinal   Endocrine  Genitourinary    Musculoskeletal   Skin  Allergic/Immunologic    Neurological    Hematological  Psychiatric/Behavioral    History:     Past Medical History:   Diagnosis Date    Benign essential HTN 10/22/2018     Past Surgical History:   Procedure Laterality Date    COLONOSCOPY N/A 3/15/2017    Procedure: COLONOSCOPY;  Surgeon: Rosalinda Dawson DO;  Location: [facility] ENDOSCOPY;  Service:     KNEE ARTHROSCOPY Left      Family History   Problem Relation Age of Onset    Hypertension Mother     COPD Mother     Diabetes Mother     Heart disease Mother     Heart failure Mother     Hypertension Father     COPD Father     Diabetes Father     Heart disease Father     Heart attack Brother     Hypertension Brother     No Known Problems Sister     COPD Daughter     Cancer Maternal Grandfather     Heart disease Paternal Grandmother     Dementia Paternal Grandfather     Alzheimer's disease Paternal Grandfather     No Known Problems Sister      Social History     Tobacco Use    Smoking status: Never Smoker    Smokeless tobacco: Never Used   Vaping Use    Vaping Use: Never used   Substance Use Topics    Alcohol use: No    Drug use: No     Prior to Admission medications    Medication Sig  "Start Date End Date Taking? Authorizing Provider   apixaban (ELIQUIS) 5 MG tablet tablet Take 1 tablet by mouth Every 12 (Twelve) Hours. Indications: Other - full anticoagulation 2/12/21   Danielle Cast APRN   aspirin (aspirin) 81 MG EC tablet Take 1 tablet by mouth Daily. 6/26/20   Ian Hastings APRN   atorvastatin (Lipitor) 20 MG tablet Take 1 tablet by mouth Every Night. 6/29/20   Ian Hastings APRN   fluticasone (Flonase) 50 MCG/ACT nasal spray 2 sprays into the nostril(s) as directed by provider Daily. 12/28/20   Ian Hastings APRN   hydroCHLOROthiazide (HYDRODIURIL) 12.5 MG tablet Take 1 tablet by mouth Daily. 12/28/20   Ian Hastings APRN   loratadine (Claritin) 5 MG/5ML syrup Take 10 mL by mouth Daily. 6/26/20   Ian Hastings APRN   sildenafil (Viagra) 100 MG tablet Take 1 tablet by mouth As Needed for Erectile Dysfunction. 6/26/20   Ian Hastings APRN     Allergies:  Penicillins    I have reviewed the patients medical history, surgical history and family history in the available medical record system.     Current Medications:     No current facility-administered medications for this encounter.       Objective     Physical Exam:    /72 (Patient Position: Lying)   Pulse 80   Temp 97 °F (36.1 °C) (Temporal)   Resp 20   Ht 167.6 cm (66\")   Wt 106 kg (233 lb)   SpO2 99%   BMI 37.61 kg/m²      Physical Exam:  General Appearance:    Alert, cooperative, in no acute distress   Head:    Normocephalic, without obvious abnormality, atraumatic   Eyes:            Lids and lashes normal, conjunctivae and sclerae normal, no icterus, no pallor, corneas clear, PERRLA   Ears:    Ears appear intact with no abnormalities noted   Throat:   No oral lesions, no thrush, oral mucosa moist   Neck:   No adenopathy, supple, trachea midline, no thyromegaly, no  carotid bruit, no JVD   Back:     No kyphosis present, no scoliosis present, no skin lesions,   erythema or scars, no tenderness " to percussion or                 palpation,  range of motion normal   Lungs:     Clear to auscultation,respirations regular, even and         unlabored    Heart:    Regular rhythm and normal rate, normal S1 and S2, no  murmur, no gallop, no rub, no click   Breast Exam:    Deferred   Abdomen:     Normal bowel sounds, no masses, no organomegaly, soft  nontender, nondistended, no guarding, no rebound                 tenderness   Genitalia:    Deferred   Extremities:   Moves all extremities well, no edema, no cyanosis, no          redness   Pulses:   Pulses palpable and equal bilaterally   Skin:   No bleeding, bruising or rash   Lymph nodes:   No palpable adenopathy   Neurologic:   Cranial nerves 2 - 12 grossly intact, sensation intact, DTR     present and equal bilaterally      Results Review:     Lab Results   Component Value Date    WBC 6.58 06/26/2020    HGB 13.8 06/26/2020    HCT 40.4 06/26/2020     06/26/2020             No results found for: LIPASE  No results found for: INR  No results found for: CULTURE    Radiology Review:  Imaging Results (Last 72 Hours)       ** No results found for the last 72 hours. **             I reviewed the patient's new clinical results.  I reviewed the patient's new imaging results and agree with the interpretation.     ASSESSMENT/PLAN:   ASSESSMENT:  1.  Personal history of colon polyps    PLAN:  1.  Colonoscopy    Risk and benefits associated with the procedure are reviewed with the patient.  The patient wished to proceed     Joseph Bustos DO  04/23/21  13:09 CDT

## 2021-04-28 LAB
LAB AP CASE REPORT: NORMAL
PATH REPORT.FINAL DX SPEC: NORMAL

## 2021-07-06 ENCOUNTER — TELEPHONE (OUTPATIENT)
Dept: FAMILY MEDICINE CLINIC | Facility: CLINIC | Age: 66
End: 2021-07-06

## 2021-07-06 NOTE — TELEPHONE ENCOUNTER
Mr. Pérez would like to get a referral to [facility] in [place](Dr Godfrey) for his ongoing back,knee and hip pain. Bernabe Elisa is aware that King is out of office until 7-

## 2021-07-12 DIAGNOSIS — G89.29 CHRONIC LOW BACK PAIN, UNSPECIFIED BACK PAIN LATERALITY, UNSPECIFIED WHETHER SCIATICA PRESENT: ICD-10-CM

## 2021-07-12 DIAGNOSIS — M25.551 RIGHT HIP PAIN: Primary | ICD-10-CM

## 2021-07-12 DIAGNOSIS — M54.50 CHRONIC LOW BACK PAIN, UNSPECIFIED BACK PAIN LATERALITY, UNSPECIFIED WHETHER SCIATICA PRESENT: ICD-10-CM

## 2021-07-12 NOTE — TELEPHONE ENCOUNTER
Patient was called to let him know referral was placed to [facility] in [place].

## 2021-08-13 ENCOUNTER — HOSPITAL ENCOUNTER (OUTPATIENT)
Dept: GENERAL RADIOLOGY | Facility: HOSPITAL | Age: 66
Discharge: HOME | End: 2021-08-13
Admitting: ANESTHESIOLOGY

## 2021-08-13 DIAGNOSIS — M17.9 OSTEOARTHRITIS OF KNEE, UNSPECIFIED: ICD-10-CM

## 2021-08-13 PROCEDURE — 73562 X-RAY EXAM OF KNEE 3: CPT

## 2021-08-24 RX ORDER — HYDROCHLOROTHIAZIDE 12.5 MG/1
TABLET ORAL
Qty: 30 TABLET | Refills: 0 | Status: SHIPPED | OUTPATIENT
Start: 2021-08-24 | End: 2021-12-23

## 2021-11-22 DIAGNOSIS — I82.532 CHRONIC DEEP VEIN THROMBOSIS (DVT) OF POPLITEAL VEIN OF LEFT LOWER EXTREMITY (HCC): Primary | ICD-10-CM

## 2021-12-03 ENCOUNTER — OFFICE VISIT (OUTPATIENT)
Dept: CARDIAC SURGERY | Facility: CLINIC | Age: 66
End: 2021-12-03

## 2021-12-03 VITALS
BODY MASS INDEX: 36.65 KG/M2 | HEIGHT: 70 IN | OXYGEN SATURATION: 98 % | HEART RATE: 92 BPM | SYSTOLIC BLOOD PRESSURE: 140 MMHG | WEIGHT: 256 LBS | DIASTOLIC BLOOD PRESSURE: 72 MMHG

## 2021-12-03 DIAGNOSIS — I82.532 CHRONIC DEEP VEIN THROMBOSIS (DVT) OF POPLITEAL VEIN OF LEFT LOWER EXTREMITY (HCC): Primary | ICD-10-CM

## 2021-12-03 DIAGNOSIS — R09.89 DECREASED PEDAL PULSES: ICD-10-CM

## 2021-12-03 DIAGNOSIS — I73.9 CLAUDICATION (HCC): ICD-10-CM

## 2021-12-03 DIAGNOSIS — G62.9 NEUROPATHY: ICD-10-CM

## 2021-12-03 DIAGNOSIS — Z79.01 CURRENT USE OF LONG TERM ANTICOAGULATION: ICD-10-CM

## 2021-12-03 DIAGNOSIS — I10 BENIGN ESSENTIAL HTN: ICD-10-CM

## 2021-12-03 PROCEDURE — 99215 OFFICE O/P EST HI 40 MIN: CPT | Performed by: NURSE PRACTITIONER

## 2021-12-03 RX ORDER — GABAPENTIN 300 MG/1
300 CAPSULE ORAL 3 TIMES DAILY
Qty: 270 CAPSULE | Refills: 0 | Status: SHIPPED | OUTPATIENT
Start: 2021-12-03

## 2021-12-03 RX ORDER — TAMSULOSIN HYDROCHLORIDE 0.4 MG/1
1 CAPSULE ORAL DAILY
COMMUNITY
End: 2022-01-10

## 2021-12-03 NOTE — PROGRESS NOTES
Subjective   Patient ID: Josh Pérez is a 66 y.o. male is being seen in follow up.      Chief Complaint   Patient presents with   • Leg Swelling     left   • Leg Pain     left       The following portions of the patient's history were reviewed and updated as appropriate: allergies, current medications, past family history, past medical history, past social history, past surgical history and problem list.  Recent images independently reviewed.  Available laboratory values reviewed.    PCP:  Ian Hastings APRN    66 y.o. male with complaints of LLE Swelling x 8-10 months, conservative treatment, diuretics. Evaluated by orthopedics for LEFT knee pain. Further workup demonstrated +thrombus LEFT popliteal vein (2018). Anticoagulation was initiated Eliquis. Thrombus resolved. Eliquis continued x 3 months. Strong family history heart disease (death ages 62,66). Reports dyspnea on exertion, occasional palpitation.  No TIA stroke amaurosis.  No MI claudication. No other associated signs, symptoms or modifying factors. Occasional LLE pain, no acute swelling. Presented for PCP eval. Duplex completed demonstrated + chronic popliteal thrombus LLE restarted on anticoagulation. Lifelong therapy. Requested evaluation for leg pain.    7/13/18: LLE Venous Duplex (BHM): +Nonocclusive thrombus popliteal vein  8/14/18: LLE Venous Duplex (HVC): Negative DVT  6/26/2020: LLE Venous duplex ([facility]): + Nonocclusive thrombus popliteal vein chronic  12/3/21: LLE Venous duplex (B[facility]): Negative DVT    Past Medical History:   Diagnosis Date   • Benign essential HTN 10/22/2018     Past Surgical History:   Procedure Laterality Date   • COLONOSCOPY N/A 3/15/2017    Procedure: COLONOSCOPY;  Surgeon: Joseph Bustos DO;  Location: [facility] ENDOSCOPY;  Service:    • COLONOSCOPY N/A 4/23/2021    Procedure: COLONOSCOPY;  Surgeon: Joseph Bustos DO;  Location: [facility] ENDOSCOPY;  Service: Gastroenterology;  Laterality: N/A;   • KNEE ARTHROSCOPY  Left        ALLERGIES:   Penicillins    MEDICATIONS:  Current outpatient and discharge medications have been reconciled for the patient.  Reviewed by: HELEN Meza     Current Outpatient Medications:   •  apixaban (ELIQUIS) 5 MG tablet tablet, Take 1 tablet by mouth Every 12 (Twelve) Hours. Indications: Other - full anticoagulation, Disp: 180 tablet, Rfl: 3  •  aspirin (aspirin) 81 MG EC tablet, Take 1 tablet by mouth Daily., Disp: 30 tablet, Rfl: 5  •  fluticasone (Flonase) 50 MCG/ACT nasal spray, 2 sprays into the nostril(s) as directed by provider Daily., Disp: 18.2 mL, Rfl: 3  •  hydroCHLOROthiazide (HYDRODIURIL) 12.5 MG tablet, TAKE 1 TABLET EVERY DAY, Disp: 30 tablet, Rfl: 0  •  loratadine (Claritin) 5 MG/5ML syrup, Take 10 mL by mouth Daily., Disp: 180 mL, Rfl: 12  •  sildenafil (Viagra) 100 MG tablet, Take 1 tablet by mouth As Needed for Erectile Dysfunction., Disp: 30 tablet, Rfl: 3  •  tamsulosin (FLOMAX) 0.4 MG capsule 24 hr capsule, Take 1 capsule by mouth Daily., Disp: , Rfl:   •  gabapentin (NEURONTIN) 300 MG capsule, Take 1 capsule by mouth 3 (Three) Times a Day., Disp: 270 capsule, Rfl: 0    Review of Systems   HENT: Negative for nosebleeds.    Eyes: Negative for visual disturbance.   Cardiovascular: Positive for claudication. Negative for cyanosis, leg swelling and palpitations.   Respiratory: Negative for hemoptysis and shortness of breath.    Hematologic/Lymphatic: Negative for bleeding problem. Does not bruise/bleed easily.   Skin: Negative for color change and nail changes.   Musculoskeletal: Positive for joint pain. Negative for muscle weakness.   Gastrointestinal: Negative for dysphagia, hematemesis and melena.   Genitourinary: Negative for hematuria.   Neurological: Positive for numbness and paresthesias. Negative for dizziness, focal weakness, light-headedness, loss of balance and weakness.   Psychiatric/Behavioral: Negative for altered mental status.   All other systems reviewed  and are negative.       Objective       Vitals:    12/03/21 1009   BP: 140/72   Pulse: 92   SpO2: 98%      Body mass index is 36.73 kg/m².  Physical Exam   Constitutional: He is oriented to person, place, and time. He appears well-developed.   HENT:   Head: Normocephalic.   Neck: Carotid bruit is not present.   Cardiovascular: Normal rate, regular rhythm and normal heart sounds.   Pulses:       Dorsalis pedis pulses are 2+ on the right side and 2+ on the left side.        Posterior tibial pulses are 2+ on the right side and 2+ on the left side.   Pulmonary/Chest: Effort normal and breath sounds normal.   Abdominal: Soft. Bowel sounds are normal.   Musculoskeletal: Normal range of motion.   Neurological: He is alert and oriented to person, place, and time. No cranial nerve deficit. Gait normal.   Skin: Skin is warm and dry. Capillary refill takes less than 2 seconds.   No venous staining   Psychiatric: Thought content normal.   Vitals reviewed.        Assessment/Plan   Independent Review of Radiographic Studies:    Detailed discussion regarding risks, benefits, and treatment plan. Images independently reviewed. Patient understands, agrees, and wishes to proceed with plan.     1. Chronic deep vein thrombosis (DVT) of popliteal vein of left lower extremity (HCC)  Negative DVT LEFT leg  Full anticoagulation : Eliquis twice daily  Length: Lifelong (recurrent dvt)  No indication for repeat venous duplex unless new symptoms occur      2. Current use of long term anticoagulation  Notify provider if you experience excessive bleeding from the nose, cuts, gums, rectum, urinary tract, or vagina. Reddish or brown urine or stool. Vomiting of blood or hemorrhoidal bleeding. If major injury occurs present to the Emergency Department.      3. Benign essential HTN  controlled    4. Claudication (HCC)  Worsening. No ischemic tissue loss  - Doppler Ankle Brachial Index Single Level CAR; Future    5. Decreased pedal pulses  Return for  vascular evaluation  - Doppler Ankle Brachial Index Single Level CAR; Future    6. Neuropathy  Neuropathy/Decrease pulses/claudication  Trial Gabapentin 3mo  If improves symptoms will need PCP/Pain Clinic for additional Rx  Return for arterial evaluation - YUNG  Office will call and schedule  - gabapentin (NEURONTIN) 300 MG capsule; Take 1 capsule by mouth 3 (Three) Times a Day.  Dispense: 270 capsule; Refill: 0       Advance Care Planning discussed and  Informational packet given to patient. Advance Care Plan on file: No     Patient's Body mass index is 36.73 kg/m². indicating that he is obese (BMI >30). Obesity-related health conditions include the following: hypertension, dyslipidemias and peripheral vascular disease. Obesity is unchanged. BMI is is above average; BMI management plan is completed. We discussed portion control..     Time spent 40 minutes in face to face evaluation, reviewing of medical history, tests, and procedures. Independent interpretation of vascular studies, ordering additional tests, documentation, and coordination of care.   Counseling and education with the patient and family regarding treatment options, plan of care, and hoped for outcomes. All questions answered.           This document has been electronically signed by HELEN Meza on December 6, 2021 15:32 CST

## 2021-12-03 NOTE — PATIENT INSTRUCTIONS
Negative DVT LEFT leg  Full anticoagulation : Eliquis twice daily  Length: Lifelong (recurrent dvt)  No indication for repeat venous duplex unless new symptoms occur    Neuropathy/Decrease pulses/claudication  Trial Gabapentin 3mo  If improves symptoms will need PCP/Pain Clinic for additional Rx  Return for arterial evaluation - YUNG  Office will call and schedule

## 2021-12-23 RX ORDER — HYDROCHLOROTHIAZIDE 12.5 MG/1
TABLET ORAL
Qty: 30 TABLET | Refills: 0 | Status: SHIPPED | OUTPATIENT
Start: 2021-12-23 | End: 2022-02-14

## 2022-01-10 ENCOUNTER — OFFICE VISIT (OUTPATIENT)
Dept: CARDIAC SURGERY | Facility: CLINIC | Age: 67
End: 2022-01-10

## 2022-01-10 VITALS
OXYGEN SATURATION: 98 % | SYSTOLIC BLOOD PRESSURE: 133 MMHG | BODY MASS INDEX: 37.08 KG/M2 | HEIGHT: 70 IN | WEIGHT: 259 LBS | DIASTOLIC BLOOD PRESSURE: 70 MMHG | HEART RATE: 72 BPM

## 2022-01-10 DIAGNOSIS — R73.03 PREDIABETES: ICD-10-CM

## 2022-01-10 DIAGNOSIS — E78.2 MIXED HYPERLIPIDEMIA: ICD-10-CM

## 2022-01-10 DIAGNOSIS — G62.9 NEUROPATHY: Primary | ICD-10-CM

## 2022-01-10 DIAGNOSIS — I10 BENIGN ESSENTIAL HTN: ICD-10-CM

## 2022-01-10 DIAGNOSIS — I82.532 CHRONIC DEEP VEIN THROMBOSIS (DVT) OF POPLITEAL VEIN OF LEFT LOWER EXTREMITY: ICD-10-CM

## 2022-01-10 DIAGNOSIS — Z79.01 CURRENT USE OF LONG TERM ANTICOAGULATION: ICD-10-CM

## 2022-01-10 PROCEDURE — 99214 OFFICE O/P EST MOD 30 MIN: CPT | Performed by: NURSE PRACTITIONER

## 2022-01-10 NOTE — PROGRESS NOTES
Subjective   Patient ID: Josh Pérez is a 66 y.o. male is being seen in follow up.      Chief Complaint   Patient presents with   • Peripheral Vascular Disease       The following portions of the patient's history were reviewed and updated as appropriate: allergies, current medications, past family history, past medical history, past social history, past surgical history and problem list.  Recent images independently reviewed.  Available laboratory values reviewed.    PCP:  Ian Hastings APRN    66 y.o. male with complaints of LLE Swelling x 8-10 months, conservative treatment, diuretics. Evaluated by orthopedics for LEFT knee pain. Further workup demonstrated +thrombus LEFT popliteal vein (2018). Anticoagulation was initiated Eliquis. Thrombus resolved. Eliquis continued x 3 months. Strong family history heart disease (death ages 62,66). Reports dyspnea on exertion, occasional palpitation.  No TIA stroke amaurosis.  No MI claudication. No other associated signs, symptoms or modifying factors. Occasional LLE pain, no acute swelling. Presented for PCP eval. Duplex completed demonstrated + chronic popliteal thrombus LLE restarted on anticoagulation. Lifelong therapy. Returns for arterial testing.     7/13/18: LLE Venous Duplex ([facility]): +Nonocclusive thrombus popliteal vein  8/14/18: LLE Venous Duplex (HVC): Negative DVT  6/26/2020: LLE Venous duplex (M): + Nonocclusive thrombus popliteal vein chronic  12/3/21: LLE Venous duplex (M): Negative DVT    1/2022: YUNG: RIGHT 1.1 Triphasic LEFT 1.1 Triphasic     Past Medical History:   Diagnosis Date   • Benign essential HTN 10/22/2018     Past Surgical History:   Procedure Laterality Date   • COLONOSCOPY N/A 3/15/2017    Procedure: COLONOSCOPY;  Surgeon: Joseph Bustos DO;  Location: [facility] ENDOSCOPY;  Service:    • COLONOSCOPY N/A 4/23/2021    Procedure: COLONOSCOPY;  Surgeon: Joseph Bustos DO;  Location: [facility] ENDOSCOPY;  Service: Gastroenterology;   Laterality: N/A;   • KNEE ARTHROSCOPY Left        ALLERGIES:   Penicillins    MEDICATIONS:  Current outpatient and discharge medications have been reconciled for the patient.  Reviewed by: HELEN Meza     Current Outpatient Medications:   •  apixaban (ELIQUIS) 5 MG tablet tablet, Take 1 tablet by mouth Every 12 (Twelve) Hours. Indications: Other - full anticoagulation, Disp: 180 tablet, Rfl: 3  •  aspirin (aspirin) 81 MG EC tablet, Take 1 tablet by mouth Daily., Disp: 30 tablet, Rfl: 5  •  atorvastatin (LIPITOR) 20 MG tablet, Take 1 tablet by mouth., Disp: , Rfl:   •  sildenafil (Viagra) 100 MG tablet, Take 1 tablet by mouth As Needed for Erectile Dysfunction., Disp: 30 tablet, Rfl: 3  •  fluticasone (Flonase) 50 MCG/ACT nasal spray, 2 sprays into the nostril(s) as directed by provider Daily., Disp: 18.2 mL, Rfl: 3  •  gabapentin (NEURONTIN) 300 MG capsule, Take 1 capsule by mouth 3 (Three) Times a Day., Disp: 270 capsule, Rfl: 0  •  hydroCHLOROthiazide (HYDRODIURIL) 12.5 MG tablet, TAKE 1 TABLET EVERY DAY, Disp: 30 tablet, Rfl: 0    Review of Systems   HENT: Negative for nosebleeds.    Eyes: Negative for visual disturbance.   Cardiovascular: Positive for claudication. Negative for cyanosis, leg swelling and palpitations.   Respiratory: Negative for hemoptysis and shortness of breath.    Hematologic/Lymphatic: Negative for bleeding problem. Does not bruise/bleed easily.   Skin: Negative for color change and nail changes.   Musculoskeletal: Positive for joint pain. Negative for muscle weakness.   Gastrointestinal: Negative for dysphagia, hematemesis and melena.   Genitourinary: Negative for hematuria.   Neurological: Positive for numbness and paresthesias. Negative for dizziness, focal weakness, light-headedness, loss of balance and weakness.   Psychiatric/Behavioral: Negative for altered mental status.   All other systems reviewed and are negative.       Objective   Heart Rate:  [72] 72  BP: (133)/(70)  133/70   Vitals:    01/10/22 0950   BP: 133/70   Pulse: 72   SpO2: 98%      Body mass index is 37.16 kg/m².  Physical Exam   Constitutional: He is oriented to person, place, and time. He appears well-developed.   HENT:   Head: Normocephalic.   Neck: Carotid bruit is not present.   Cardiovascular: Normal rate, regular rhythm and normal heart sounds.   Pulses:       Dorsalis pedis pulses are 2+ on the right side and 2+ on the left side.        Posterior tibial pulses are 2+ on the right side and 2+ on the left side.   Pulmonary/Chest: Effort normal and breath sounds normal.   Abdominal: Soft. Bowel sounds are normal.   Musculoskeletal: Normal range of motion.   Neurological: He is alert and oriented to person, place, and time. No cranial nerve deficit. Gait normal.   Skin: Skin is warm and dry. Capillary refill takes less than 2 seconds.   No venous staining   Psychiatric: Thought content normal.   Vitals reviewed.        Assessment/Plan   Independent Review of Radiographic Studies:    Detailed discussion regarding risks, benefits, and treatment plan. Images independently reviewed. Patient understands, agrees, and wishes to proceed with plan.     1. Neuropathy  Normal arterial perfusion  -Trial Gabapentin  -Further refills from pain management      2. Chronic deep vein thrombosis (DVT) of popliteal vein of left lower extremity (HCC)  Anticoagulation: Eliquis  -Lifelong  -No indication for further vascular imaging    Follow up Vascular as needed      3. Mixed hyperlipidemia      4. Prediabetes      5. Benign essential HTN  Controlled     6. Current use of long term anticoagulation  Notify provider if you experience excessive bleeding from the nose, cuts, gums, rectum, urinary tract, or vagina. Reddish or brown urine or stool. Vomiting of blood or hemorrhoidal bleeding. If major injury occurs present to the Emergency Department.    - apixaban (ELIQUIS) 5 MG tablet tablet; Take 1 tablet by mouth Every 12 (Twelve) Hours.  Indications: Other - full anticoagulation  Dispense: 180 tablet; Refill: 3     Advance Care Planning discussed and  Informational packet given to patient. Advance Care Plan on file: No     Patient's Body mass index is 37.16 kg/m². indicating that he is obese (BMI >30). Obesity-related health conditions include the following: hypertension, dyslipidemias and peripheral vascular disease. Obesity is unchanged. BMI is is above average; BMI management plan is completed. We discussed portion control..     Time spent 30 minutes in face to face evaluation, reviewing of medical history, tests, and procedures. Independent interpretation of vascular studies, ordering additional tests, documentation, and coordination of care.   Counseling and education with the patient and family regarding treatment options, plan of care, and hoped for outcomes. All questions answered.           This document has been electronically signed by HELEN Meza on January 10, 2022 13:46 CST

## 2022-01-10 NOTE — PATIENT INSTRUCTIONS
Normal arterial flow bilateral lower extremities    Neuropathy  -Trial Gabapentin  -Further refills from pain management    Anticoagulation: Eliquis  -Lifelong  -No indication for further vascular imaging    Follow up Vascular as needed

## 2022-01-21 ENCOUNTER — TRANSCRIBE ORDERS (OUTPATIENT)
Dept: ORTHOPEDIC SURGERY | Facility: CLINIC | Age: 67
End: 2022-01-21

## 2022-01-21 DIAGNOSIS — M25.551 RIGHT HIP PAIN: Primary | ICD-10-CM

## 2022-02-14 RX ORDER — HYDROCHLOROTHIAZIDE 12.5 MG/1
TABLET ORAL
Qty: 30 TABLET | Refills: 0 | Status: SHIPPED | OUTPATIENT
Start: 2022-02-14 | End: 2022-03-28

## 2022-03-28 RX ORDER — HYDROCHLOROTHIAZIDE 12.5 MG/1
TABLET ORAL
Qty: 90 TABLET | Refills: 0 | Status: SHIPPED | OUTPATIENT
Start: 2022-03-28 | End: 2022-11-07 | Stop reason: SDUPTHER

## 2022-09-21 ENCOUNTER — TRANSCRIBE ORDERS (OUTPATIENT)
Dept: LAB | Facility: HOSPITAL | Age: 67
End: 2022-09-21

## 2022-09-21 ENCOUNTER — LAB (OUTPATIENT)
Dept: LAB | Facility: HOSPITAL | Age: 67
End: 2022-09-21

## 2022-09-21 DIAGNOSIS — Z79.899 ENCOUNTER FOR LONG-TERM (CURRENT) USE OF OTHER MEDICATIONS: ICD-10-CM

## 2022-09-21 DIAGNOSIS — D89.89 AUTOIMMUNE DISORDER: Primary | ICD-10-CM

## 2022-09-21 DIAGNOSIS — D89.89 AUTOIMMUNE DISORDER: ICD-10-CM

## 2022-09-21 LAB
ALBUMIN SERPL-MCNC: 4.2 G/DL (ref 3.5–5.2)
ALBUMIN/GLOB SERPL: 1.6 G/DL
ALP SERPL-CCNC: 53 U/L (ref 39–117)
ALT SERPL W P-5'-P-CCNC: 26 U/L (ref 1–41)
ANION GAP SERPL CALCULATED.3IONS-SCNC: 8 MMOL/L (ref 5–15)
AST SERPL-CCNC: 34 U/L (ref 1–40)
BILIRUB SERPL-MCNC: 0.4 MG/DL (ref 0–1.2)
BUN SERPL-MCNC: 17 MG/DL (ref 8–23)
BUN/CREAT SERPL: 18.1 (ref 7–25)
CALCIUM SPEC-SCNC: 9.7 MG/DL (ref 8.6–10.5)
CHLORIDE SERPL-SCNC: 104 MMOL/L (ref 98–107)
CO2 SERPL-SCNC: 27 MMOL/L (ref 22–29)
CREAT SERPL-MCNC: 0.94 MG/DL (ref 0.76–1.27)
DEPRECATED RDW RBC AUTO: 45.3 FL (ref 37–54)
EGFRCR SERPLBLD CKD-EPI 2021: 88.9 ML/MIN/1.73
EOSINOPHIL # BLD MANUAL: 0.06 10*3/MM3 (ref 0–0.4)
EOSINOPHIL NFR BLD MANUAL: 1 % (ref 0.3–6.2)
ERYTHROCYTE [DISTWIDTH] IN BLOOD BY AUTOMATED COUNT: 13.5 % (ref 12.3–15.4)
GLOBULIN UR ELPH-MCNC: 2.7 GM/DL
GLUCOSE SERPL-MCNC: 97 MG/DL (ref 65–99)
HCT VFR BLD AUTO: 40.6 % (ref 37.5–51)
HGB BLD-MCNC: 13.8 G/DL (ref 13–17.7)
LYMPHOCYTES # BLD MANUAL: 1.33 10*3/MM3 (ref 0.7–3.1)
LYMPHOCYTES NFR BLD MANUAL: 3 % (ref 5–12)
MCH RBC QN AUTO: 31.4 PG (ref 26.6–33)
MCHC RBC AUTO-ENTMCNC: 34 G/DL (ref 31.5–35.7)
MCV RBC AUTO: 92.3 FL (ref 79–97)
MONOCYTES # BLD: 0.17 10*3/MM3 (ref 0.1–0.9)
NEUTROPHILS # BLD AUTO: 4.23 10*3/MM3 (ref 1.7–7)
NEUTROPHILS NFR BLD MANUAL: 73 % (ref 42.7–76)
PLAT MORPH BLD: NORMAL
PLATELET # BLD AUTO: 294 10*3/MM3 (ref 140–450)
PMV BLD AUTO: 9.9 FL (ref 6–12)
POTASSIUM SERPL-SCNC: 4.2 MMOL/L (ref 3.5–5.2)
PROT SERPL-MCNC: 6.9 G/DL (ref 6–8.5)
RBC # BLD AUTO: 4.4 10*6/MM3 (ref 4.14–5.8)
RBC MORPH BLD: NORMAL
SODIUM SERPL-SCNC: 139 MMOL/L (ref 136–145)
VARIANT LYMPHS NFR BLD MANUAL: 23 % (ref 19.6–45.3)
WBC MORPH BLD: NORMAL
WBC NRBC COR # BLD: 5.79 10*3/MM3 (ref 3.4–10.8)

## 2022-09-21 PROCEDURE — 80053 COMPREHEN METABOLIC PANEL: CPT

## 2022-09-21 PROCEDURE — 86235 NUCLEAR ANTIGEN ANTIBODY: CPT

## 2022-09-21 PROCEDURE — 36415 COLL VENOUS BLD VENIPUNCTURE: CPT

## 2022-09-21 PROCEDURE — 85027 COMPLETE CBC AUTOMATED: CPT

## 2022-09-21 PROCEDURE — 85007 BL SMEAR W/DIFF WBC COUNT: CPT

## 2022-09-21 PROCEDURE — 86225 DNA ANTIBODY NATIVE: CPT

## 2022-09-22 LAB
CENTROMERE B AB SER-ACNC: <0.2 AI (ref 0–0.9)
CHROMATIN AB SERPL-ACNC: <0.2 AI (ref 0–0.9)
DSDNA AB SER-ACNC: <1 IU/ML (ref 0–9)
ENA JO1 AB SER-ACNC: <0.2 AI (ref 0–0.9)
ENA RNP AB SER-ACNC: 0.3 AI (ref 0–0.9)
ENA SCL70 AB SER-ACNC: 0.2 AI (ref 0–0.9)
ENA SM AB SER-ACNC: <0.2 AI (ref 0–0.9)
ENA SS-A AB SER-ACNC: 0.3 AI (ref 0–0.9)
ENA SS-B AB SER-ACNC: <0.2 AI (ref 0–0.9)
Lab: NORMAL

## 2022-10-03 RX ORDER — HYDROCHLOROTHIAZIDE 12.5 MG/1
TABLET ORAL
Qty: 90 TABLET | Refills: 0 | OUTPATIENT
Start: 2022-10-03

## 2022-10-04 ENCOUNTER — TELEPHONE (OUTPATIENT)
Dept: FAMILY MEDICINE CLINIC | Facility: CLINIC | Age: 67
End: 2022-10-04

## 2022-10-04 NOTE — TELEPHONE ENCOUNTER
Patient was called; no answer.  A message was left to call office to schedule a follow up appt. For refills.

## 2022-10-18 RX ORDER — HYDROCHLOROTHIAZIDE 12.5 MG/1
12.5 TABLET ORAL DAILY
Qty: 90 TABLET | Refills: 0 | OUTPATIENT
Start: 2022-10-18

## 2022-11-07 ENCOUNTER — LAB (OUTPATIENT)
Dept: LAB | Facility: HOSPITAL | Age: 67
End: 2022-11-07

## 2022-11-07 ENCOUNTER — OFFICE VISIT (OUTPATIENT)
Dept: FAMILY MEDICINE CLINIC | Facility: CLINIC | Age: 67
End: 2022-11-07

## 2022-11-07 VITALS
DIASTOLIC BLOOD PRESSURE: 68 MMHG | HEART RATE: 88 BPM | SYSTOLIC BLOOD PRESSURE: 122 MMHG | TEMPERATURE: 98.1 F | WEIGHT: 255.7 LBS | BODY MASS INDEX: 36.61 KG/M2 | OXYGEN SATURATION: 97 % | HEIGHT: 70 IN | RESPIRATION RATE: 18 BRPM

## 2022-11-07 DIAGNOSIS — Z00.00 ANNUAL PHYSICAL EXAM: ICD-10-CM

## 2022-11-07 DIAGNOSIS — Z12.5 PROSTATE CANCER SCREENING: ICD-10-CM

## 2022-11-07 DIAGNOSIS — K21.9 GASTROESOPHAGEAL REFLUX DISEASE WITHOUT ESOPHAGITIS: ICD-10-CM

## 2022-11-07 DIAGNOSIS — E78.2 MIXED HYPERLIPIDEMIA: ICD-10-CM

## 2022-11-07 DIAGNOSIS — M48.061 DEGENERATIVE LUMBAR SPINAL STENOSIS: ICD-10-CM

## 2022-11-07 DIAGNOSIS — R73.03 PREDIABETES: ICD-10-CM

## 2022-11-07 DIAGNOSIS — Z00.00 ANNUAL PHYSICAL EXAM: Primary | ICD-10-CM

## 2022-11-07 DIAGNOSIS — I10 BENIGN ESSENTIAL HTN: ICD-10-CM

## 2022-11-07 LAB
ALBUMIN SERPL-MCNC: 4.2 G/DL (ref 3.5–5.2)
ALBUMIN/GLOB SERPL: 1.7 G/DL
ALP SERPL-CCNC: 51 U/L (ref 39–117)
ALT SERPL W P-5'-P-CCNC: 21 U/L (ref 1–41)
ANION GAP SERPL CALCULATED.3IONS-SCNC: 9 MMOL/L (ref 5–15)
AST SERPL-CCNC: 27 U/L (ref 1–40)
BASOPHILS # BLD AUTO: 0.05 10*3/MM3 (ref 0–0.2)
BASOPHILS NFR BLD AUTO: 0.8 % (ref 0–1.5)
BILIRUB SERPL-MCNC: 0.2 MG/DL (ref 0–1.2)
BUN SERPL-MCNC: 14 MG/DL (ref 8–23)
BUN/CREAT SERPL: 15.6 (ref 7–25)
CALCIUM SPEC-SCNC: 9.5 MG/DL (ref 8.6–10.5)
CHLORIDE SERPL-SCNC: 103 MMOL/L (ref 98–107)
CHOLEST SERPL-MCNC: 156 MG/DL (ref 0–200)
CO2 SERPL-SCNC: 25 MMOL/L (ref 22–29)
CREAT SERPL-MCNC: 0.9 MG/DL (ref 0.76–1.27)
DEPRECATED RDW RBC AUTO: 45.8 FL (ref 37–54)
EGFRCR SERPLBLD CKD-EPI 2021: 93.6 ML/MIN/1.73
EOSINOPHIL # BLD AUTO: 0.16 10*3/MM3 (ref 0–0.4)
EOSINOPHIL NFR BLD AUTO: 2.7 % (ref 0.3–6.2)
ERYTHROCYTE [DISTWIDTH] IN BLOOD BY AUTOMATED COUNT: 13.8 % (ref 12.3–15.4)
GLOBULIN UR ELPH-MCNC: 2.5 GM/DL
GLUCOSE SERPL-MCNC: 97 MG/DL (ref 65–99)
HBA1C MFR BLD: 6.3 % (ref 4.8–5.6)
HCT VFR BLD AUTO: 40.9 % (ref 37.5–51)
HDLC SERPL-MCNC: 33 MG/DL (ref 40–60)
HGB BLD-MCNC: 13.9 G/DL (ref 13–17.7)
IMM GRANULOCYTES # BLD AUTO: 0.02 10*3/MM3 (ref 0–0.05)
IMM GRANULOCYTES NFR BLD AUTO: 0.3 % (ref 0–0.5)
LDLC SERPL CALC-MCNC: 99 MG/DL (ref 0–100)
LDLC/HDLC SERPL: 2.91 {RATIO}
LYMPHOCYTES # BLD AUTO: 1.73 10*3/MM3 (ref 0.7–3.1)
LYMPHOCYTES NFR BLD AUTO: 29.3 % (ref 19.6–45.3)
MCH RBC QN AUTO: 31.2 PG (ref 26.6–33)
MCHC RBC AUTO-ENTMCNC: 34 G/DL (ref 31.5–35.7)
MCV RBC AUTO: 91.7 FL (ref 79–97)
MONOCYTES # BLD AUTO: 0.52 10*3/MM3 (ref 0.1–0.9)
MONOCYTES NFR BLD AUTO: 8.8 % (ref 5–12)
NEUTROPHILS NFR BLD AUTO: 3.43 10*3/MM3 (ref 1.7–7)
NEUTROPHILS NFR BLD AUTO: 58.1 % (ref 42.7–76)
NRBC BLD AUTO-RTO: 0 /100 WBC (ref 0–0.2)
PLATELET # BLD AUTO: 315 10*3/MM3 (ref 140–450)
PMV BLD AUTO: 9.9 FL (ref 6–12)
POTASSIUM SERPL-SCNC: 4.5 MMOL/L (ref 3.5–5.2)
PROT SERPL-MCNC: 6.7 G/DL (ref 6–8.5)
PSA SERPL-MCNC: 0.58 NG/ML (ref 0–4)
RBC # BLD AUTO: 4.46 10*6/MM3 (ref 4.14–5.8)
SODIUM SERPL-SCNC: 137 MMOL/L (ref 136–145)
TRIGL SERPL-MCNC: 135 MG/DL (ref 0–150)
TSH SERPL DL<=0.05 MIU/L-ACNC: 3.12 UIU/ML (ref 0.27–4.2)
VLDLC SERPL-MCNC: 24 MG/DL (ref 5–40)
WBC NRBC COR # BLD: 5.91 10*3/MM3 (ref 3.4–10.8)

## 2022-11-07 PROCEDURE — 85025 COMPLETE CBC W/AUTO DIFF WBC: CPT

## 2022-11-07 PROCEDURE — 84443 ASSAY THYROID STIM HORMONE: CPT

## 2022-11-07 PROCEDURE — 99214 OFFICE O/P EST MOD 30 MIN: CPT | Performed by: NURSE PRACTITIONER

## 2022-11-07 PROCEDURE — G0103 PSA SCREENING: HCPCS

## 2022-11-07 PROCEDURE — 36415 COLL VENOUS BLD VENIPUNCTURE: CPT

## 2022-11-07 PROCEDURE — 80053 COMPREHEN METABOLIC PANEL: CPT

## 2022-11-07 PROCEDURE — 83036 HEMOGLOBIN GLYCOSYLATED A1C: CPT

## 2022-11-07 PROCEDURE — 80061 LIPID PANEL: CPT

## 2022-11-07 RX ORDER — HYDROCHLOROTHIAZIDE 12.5 MG/1
12.5 TABLET ORAL DAILY
Qty: 90 TABLET | Refills: 3 | Status: SHIPPED | OUTPATIENT
Start: 2022-11-07

## 2022-11-07 RX ORDER — FAMOTIDINE 20 MG/1
20 TABLET, FILM COATED ORAL 2 TIMES DAILY PRN
Qty: 60 TABLET | Refills: 3 | Status: SHIPPED | OUTPATIENT
Start: 2022-11-07

## 2022-11-07 RX ORDER — CLOBETASOL PROPIONATE 0.46 MG/ML
SOLUTION TOPICAL
COMMUNITY

## 2022-11-07 RX ORDER — HYDROCODONE BITARTRATE AND ACETAMINOPHEN 5; 325 MG/1; MG/1
TABLET ORAL
COMMUNITY

## 2022-11-07 RX ORDER — FLUTICASONE PROPIONATE 0.05 %
CREAM (GRAM) TOPICAL
COMMUNITY

## 2022-11-07 RX ORDER — FOLIC ACID 1 MG/1
TABLET ORAL
COMMUNITY
Start: 2022-09-29

## 2022-11-07 RX ORDER — FINASTERIDE 5 MG/1
TABLET, FILM COATED ORAL
COMMUNITY

## 2022-11-07 NOTE — PROGRESS NOTES
Subjective   Josh Pérez is a 67 y.o. male.     History of Present Illness  CC: Hypertension, hyperlipidemia, prediabetes, back pain  Hypertension  This is a chronic problem. The current episode started more than 1 year ago. The problem is controlled. Pertinent negatives include no blurred vision, chest pain, palpitations or shortness of breath. Risk factors for coronary artery disease include family history, dyslipidemia, obesity, male gender and sedentary lifestyle. Current antihypertension treatment includes diuretics. The current treatment provides significant improvement. Compliance problems include exercise and diet.  There is no history of angina, kidney disease or CAD/MI.   Hyperlipidemia  This is a chronic problem. The current episode started more than 1 year ago. Recent lipid tests were reviewed and are variable. Exacerbating diseases include diabetes (prediabetes) and obesity. Factors aggravating his hyperlipidemia include fatty foods and thiazides. Pertinent negatives include no chest pain, focal sensory loss, focal weakness, leg pain, myalgias or shortness of breath. Current antihyperlipidemic treatment includes statins. The current treatment provides significant improvement of lipids. Compliance problems include adherence to exercise and adherence to diet.  Risk factors for coronary artery disease include family history, dyslipidemia, hypertension, male sex, obesity and a sedentary lifestyle.   Blood Sugar Problem  This is a chronic (prediabetes) problem. The current episode started more than 1 year ago. Pertinent negatives include no abdominal pain, chest pain, chills, congestion, coughing, diaphoresis, fatigue, fever, myalgias, nausea, rash, sore throat, vomiting or weakness. The symptoms are aggravated by drinking and eating. He has tried eating and drinking for the symptoms. The treatment provided mild relief.   Back Pain  This is a chronic problem. The current episode started more than 1 year  ago. The problem occurs constantly. The problem has been gradually worsening since onset. The pain is present in the lumbar spine. The quality of the pain is described as aching. The pain is at a severity of 4/10. The pain is moderate. The symptoms are aggravated by twisting, standing, position and bending. Stiffness is present all day. Pertinent negatives include no abdominal pain, chest pain, dysuria, fever, leg pain, weakness or weight loss. Risk factors include obesity, sedentary lifestyle and lack of exercise. He has tried NSAIDs, muscle relaxant, analgesics, bed rest, home exercises and heat for the symptoms. The treatment provided mild relief.        The following portions of the patient's history were reviewed and updated as appropriate: allergies, current medications, past family history, past medical history, past social history, past surgical history and problem list.    Review of Systems   Constitutional: Negative for activity change, appetite change, chills, diaphoresis, fatigue, fever, unexpected weight gain and unexpected weight loss.   HENT: Negative for congestion, sore throat, trouble swallowing and voice change.    Eyes: Negative for blurred vision, double vision, photophobia, pain and visual disturbance.   Respiratory: Negative for cough, chest tightness, shortness of breath and wheezing.    Cardiovascular: Negative for chest pain, palpitations and leg swelling.   Gastrointestinal: Negative for abdominal distention, abdominal pain, anal bleeding, blood in stool, constipation, diarrhea, nausea, vomiting, GERD and indigestion.   Endocrine: Negative for cold intolerance, heat intolerance, polydipsia, polyphagia and polyuria.   Genitourinary: Negative for dysuria, hematuria and urgency.   Musculoskeletal: Positive for back pain and gait problem (due to back pain). Negative for myalgias.   Skin: Negative for rash.   Allergic/Immunologic: Negative.    Neurological: Negative for dizziness, focal  weakness, syncope, weakness, light-headedness and headache.   Hematological: Negative.    Psychiatric/Behavioral: Negative for depressed mood.       Objective   Physical Exam  Vitals and nursing note reviewed.   Constitutional:       General: He is not in acute distress.     Appearance: Normal appearance. He is well-developed. He is obese. He is not ill-appearing, toxic-appearing or diaphoretic.   HENT:      Head: Normocephalic and atraumatic.      Right Ear: External ear normal.      Left Ear: External ear normal.      Nose: Nose normal.   Eyes:      Conjunctiva/sclera: Conjunctivae normal.      Pupils: Pupils are equal, round, and reactive to light.   Neck:      Thyroid: No thyromegaly.      Trachea: No tracheal deviation.   Cardiovascular:      Rate and Rhythm: Normal rate and regular rhythm.      Heart sounds: Normal heart sounds. No murmur heard.    No friction rub. No gallop.   Pulmonary:      Effort: Pulmonary effort is normal. No respiratory distress.      Breath sounds: Normal breath sounds. No stridor. No wheezing, rhonchi or rales.   Abdominal:      General: Bowel sounds are normal. There is no distension.      Palpations: Abdomen is soft. There is no mass.      Tenderness: There is no abdominal tenderness. There is no guarding or rebound.      Hernia: No hernia is present.   Musculoskeletal:      Cervical back: Normal range of motion and neck supple.      Lumbar back: Tenderness and bony tenderness present. Decreased range of motion.   Lymphadenopathy:      Cervical: No cervical adenopathy.   Skin:     General: Skin is warm and dry.      Coloration: Skin is not pale.      Findings: No erythema or rash.   Neurological:      Mental Status: He is alert and oriented to person, place, and time.      Cranial Nerves: No cranial nerve deficit.      Coordination: Coordination normal.   Psychiatric:         Mood and Affect: Mood normal.         Behavior: Behavior normal.         Thought Content: Thought content  normal.         Judgment: Judgment normal.           Assessment & Plan   Diagnoses and all orders for this visit:    1. Annual physical exam (Primary)  -     CBC & Differential; Future  -     Comprehensive Metabolic Panel; Future  -     Hemoglobin A1c; Future  -     Lipid Panel; Future  -     TSH; Future  -     PSA Screen; Future, will call with results.    2. Prostate cancer screening  -     PSA Screen; Future, will call with results    3. Benign essential HTN  -     CBC & Differential; Future  -     Comprehensive Metabolic Panel; Future  -     hydroCHLOROthiazide (HYDRODIURIL) 12.5 MG tablet; Take 1 tablet by mouth Daily.  Dispense: 90 tablet; Refill: 3   -Controlled.  Will call with lab results.  Continue HCTZ as prescribed.  We will continue to monitor.    4. Mixed hyperlipidemia  -     Lipid Panel; Future  -     TSH; Future, will call with results.  Continue low-fat diet and Lipitor as prescribed.  We will continue to monitor.    5. Prediabetes  -     Comprehensive Metabolic Panel; Future  -     Hemoglobin A1c; Future, will call with results.  Continue low-carb diet.  We will continue to monitor.    6. Degenerative lumbar spinal stenosis  -     Ambulatory Referral to Neurosurgery, follow-up as scheduled.  Patient was evaluated by a surgeon in [place] and reports he needs surgery.  However due to the distance he is decided for surgeon closer to home.  Patient recently had MRI of lumbar spine completed at [facility] not concerning.  We have asked that these records be obtained and a copy of the disc/imaging be sent directly to Dr. Mclaughlin's office for review.  Follow-up with neurosurgery as scheduled.    7. Gastroesophageal reflux disease without esophagitis  -     famotidine (Pepcid) 20 MG tablet; Take 1 tablet by mouth 2 (Two) Times a Day As Needed for Heartburn.  Dispense: 60 tablet; Refill: 3    8.  Follow-up in 6 months or sooner for any acute needs.            This document has been electronically signed by  Ian Hastings, APRN on November 7, 2022 12:49 CST

## 2022-11-08 NOTE — PROGRESS NOTES
Hemoglobin A1c up to 6.3.  He needs to follow a strict low-carb diet.  If levels continue to rise he will become diabetic and will need require medication.  We will continue to monitor.  Follow-up as scheduled.

## 2022-11-30 ENCOUNTER — TELEPHONE (OUTPATIENT)
Dept: FAMILY MEDICINE CLINIC | Facility: CLINIC | Age: 67
End: 2022-11-30

## 2022-12-22 ENCOUNTER — TELEPHONE (OUTPATIENT)
Dept: FAMILY MEDICINE CLINIC | Facility: CLINIC | Age: 67
End: 2022-12-22

## 2022-12-22 NOTE — TELEPHONE ENCOUNTER
Patient was called to let him know the annual exam done on 11/07/2022 was coded correctly for annual physical as that was his primary reason for his visit.  Instructed him to call billing; number provided, to discuss further issues with his bill.

## 2023-01-23 ENCOUNTER — TELEPHONE (OUTPATIENT)
Dept: FAMILY MEDICINE CLINIC | Facility: CLINIC | Age: 68
End: 2023-01-23
Payer: MEDICARE

## 2023-01-23 NOTE — TELEPHONE ENCOUNTER
Pt needs a handicap placecard about to have more surgery  Knee and back and has decided he needs to go ahead and get  one so will bring by and if you will call when finished he will pick it up . Thank You   Pt [phone]

## 2023-02-24 RX ORDER — HYDROCODONE BITARTRATE AND ACETAMINOPHEN 5; 325 MG/1; MG/1
TABLET ORAL
Status: CANCELLED | OUTPATIENT
Start: 2023-02-24

## 2023-02-24 NOTE — TELEPHONE ENCOUNTER
Incoming Refill Request      Medication requested (name and dose): HYDROcodone-acetaminophen (NORCO) 5-325 MG per tablet    Pharmacy where request should be sent: [facility]     Additional details provided by patient: Pt cant get into ortho till March.  Wanting to know if [facility] will fill pain medication until he sees ortho    Best call back number: [phone]    Does the patient have less than a 3 day supply:  [] Yes  [x] No    Sweta Arnold Rep  02/24/23, 16:12 CST

## 2023-02-27 RX ORDER — HYDROCODONE BITARTRATE AND ACETAMINOPHEN 5; 325 MG/1; MG/1
TABLET ORAL
OUTPATIENT
Start: 2023-02-27

## 2023-04-13 DIAGNOSIS — Z79.01 CURRENT USE OF LONG TERM ANTICOAGULATION: ICD-10-CM

## 2023-04-13 RX ORDER — APIXABAN 5 MG/1
TABLET, FILM COATED ORAL
Qty: 180 TABLET | Refills: 2 | Status: SHIPPED | OUTPATIENT
Start: 2023-04-13

## 2023-08-25 ENCOUNTER — OFFICE VISIT (OUTPATIENT)
Dept: FAMILY MEDICINE CLINIC | Facility: CLINIC | Age: 68
End: 2023-08-25
Payer: MEDICARE

## 2023-08-25 VITALS
OXYGEN SATURATION: 96 % | BODY MASS INDEX: 36.08 KG/M2 | DIASTOLIC BLOOD PRESSURE: 84 MMHG | HEIGHT: 70 IN | TEMPERATURE: 98.4 F | WEIGHT: 252 LBS | SYSTOLIC BLOOD PRESSURE: 160 MMHG | RESPIRATION RATE: 16 BRPM | HEART RATE: 72 BPM

## 2023-08-25 DIAGNOSIS — J30.2 SEASONAL ALLERGIES: ICD-10-CM

## 2023-08-25 DIAGNOSIS — N52.9 ERECTILE DYSFUNCTION, UNSPECIFIED ERECTILE DYSFUNCTION TYPE: ICD-10-CM

## 2023-08-25 DIAGNOSIS — M46.86 OTHER SPECIFIED INFLAMMATORY SPONDYLOPATHIES, LUMBAR REGION: ICD-10-CM

## 2023-08-25 DIAGNOSIS — R73.03 PREDIABETES: ICD-10-CM

## 2023-08-25 DIAGNOSIS — Z00.00 MEDICARE ANNUAL WELLNESS VISIT, SUBSEQUENT: Primary | ICD-10-CM

## 2023-08-25 DIAGNOSIS — I10 BENIGN ESSENTIAL HTN: ICD-10-CM

## 2023-08-25 DIAGNOSIS — I82.532 CHRONIC DEEP VEIN THROMBOSIS (DVT) OF POPLITEAL VEIN OF LEFT LOWER EXTREMITY: ICD-10-CM

## 2023-08-25 DIAGNOSIS — Z12.5 PROSTATE CANCER SCREENING: ICD-10-CM

## 2023-08-25 DIAGNOSIS — E78.2 MIXED HYPERLIPIDEMIA: ICD-10-CM

## 2023-08-25 DIAGNOSIS — E66.01 CLASS 2 SEVERE OBESITY DUE TO EXCESS CALORIES WITH SERIOUS COMORBIDITY AND BODY MASS INDEX (BMI) OF 36.0 TO 36.9 IN ADULT: ICD-10-CM

## 2023-08-25 PROBLEM — M51.36 DEGENERATION OF LUMBAR INTERVERTEBRAL DISC: Status: ACTIVE | Noted: 2021-08-09

## 2023-08-25 PROBLEM — M47.816 LUMBAR SPONDYLOSIS: Status: ACTIVE | Noted: 2021-08-09

## 2023-08-25 PROBLEM — M17.9 OSTEOARTHRITIS OF KNEE: Status: ACTIVE | Noted: 2021-08-09

## 2023-08-25 PROBLEM — M25.559 GREATER TROCHANTERIC PAIN SYNDROME: Status: ACTIVE | Noted: 2021-08-09

## 2023-08-25 PROBLEM — C44.319 BASAL CELL CARCINOMA (BCC) OF RIGHT MEDIAL CHEEK: Status: ACTIVE | Noted: 2021-09-01

## 2023-08-25 PROBLEM — Z86.010 HISTORY OF COLONIC POLYPS: Status: ACTIVE | Noted: 2023-08-25

## 2023-08-25 PROBLEM — M48.061 SPINAL STENOSIS OF LUMBAR REGION: Status: ACTIVE | Noted: 2022-04-22

## 2023-08-25 RX ORDER — SILDENAFIL 100 MG/1
100 TABLET, FILM COATED ORAL AS NEEDED
Qty: 30 TABLET | Refills: 3 | Status: SHIPPED | OUTPATIENT
Start: 2023-08-25

## 2023-08-25 RX ORDER — GUAIFENESIN 200 MG/10ML
10 LIQUID ORAL
COMMUNITY

## 2023-08-25 RX ORDER — LEVOCETIRIZINE DIHYDROCHLORIDE 5 MG/1
5 TABLET, FILM COATED ORAL EVERY EVENING
Qty: 90 TABLET | Refills: 3 | Status: SHIPPED | OUTPATIENT
Start: 2023-08-25

## 2023-08-25 RX ORDER — HYDROCHLOROTHIAZIDE 25 MG/1
25 TABLET ORAL DAILY
Qty: 90 TABLET | Refills: 3 | Status: SHIPPED | OUTPATIENT
Start: 2023-08-25

## 2023-08-25 NOTE — PROGRESS NOTES
The ABCs of the Annual Wellness Visit  Subsequent Medicare Wellness Visit    Subjective    Josh Pérez is a 68 y.o. male who presents for a Subsequent Medicare Wellness Visit.    The following portions of the patient's history were reviewed and   updated as appropriate: allergies, current medications, past family history, past medical history, past social history, past surgical history, and problem list.    Compared to one year ago, the patient feels his physical   health is the same.    Compared to one year ago, the patient feels his mental   health is the same.    Recent Hospitalizations:  He was admitted within the past 365 days at [facility].       Current Medical Providers:  Patient Care Team:  Ian Hastings APRN as PCP - General (Nurse Practitioner)  Jsoeph Eddy MD as Surgeon (Orthopedic Surgery)  Eliz Bhatt MA as Medical Assistant    Outpatient Medications Prior to Visit   Medication Sig Dispense Refill    aspirin (aspirin) 81 MG EC tablet Take 1 tablet by mouth Daily. 30 tablet 5    atorvastatin (LIPITOR) 20 MG tablet Take 1 tablet by mouth.      Eliquis 5 MG tablet tablet TAKE 1 TABLET EVERY 12 HOURS 180 tablet 2    famotidine (Pepcid) 20 MG tablet Take 1 tablet by mouth 2 (Two) Times a Day As Needed for Heartburn. 60 tablet 3    finasteride (PROSCAR) 5 MG tablet finasteride 5 mg tablet   TAKE 1 TABLET EVERY DAY      fluticasone (CUTIVATE) 0.05 % cream fluticasone propionate 0.05 % topical cream      fluticasone (Flonase) 50 MCG/ACT nasal spray 2 sprays into the nostril(s) as directed by provider Daily. 18.2 mL 3    guaifenesin (ROBITUSSIN) 100 MG/5ML liquid Take 10 mL by mouth.      hydroCHLOROthiazide (HYDRODIURIL) 12.5 MG tablet Take 1 tablet by mouth Daily. 90 tablet 3    sildenafil (Viagra) 100 MG tablet Take 1 tablet by mouth As Needed for Erectile Dysfunction. 30 tablet 3    gabapentin (NEURONTIN) 300 MG capsule Take 1 capsule by mouth 3 (Three) Times a Day.  (Patient not taking: Reported on 8/25/2023) 270 capsule 0    methotrexate 2.5 MG tablet methotrexate sodium 2.5 mg tablet      clobetasol (TEMOVATE) 0.05 % external solution clobetasol 0.05 % scalp solution      folic acid (FOLVITE) 1 MG tablet       HYDROcodone-acetaminophen (NORCO) 5-325 MG per tablet hydrocodone 5 mg-acetaminophen 325 mg tablet   TAKE 1 TABLET BY MOUTH TWICE DAILY AS NEEDED       No facility-administered medications prior to visit.       No opioid medication identified on active medication list. I have reviewed chart for other potential  high risk medication/s and harmful drug interactions in the elderly.        Aspirin is on active medication list. Aspirin use is indicated based on review of current medical condition/s. Pros and cons of this therapy have been discussed today. Benefits of this medication outweigh potential harm.  Patient has been encouraged to continue taking this medication.  .      Patient Active Problem List   Diagnosis    Left knee pain    Tear of medial meniscus of left knee, current, initial encounter    Left leg swelling    Chronic deep vein thrombosis (DVT) of popliteal vein of left lower extremity    Heart palpitations    Shortness of breath    Benign essential HTN    Dyspepsia and other specified disorders of function of stomach    Male erectile dysfunction, unspecified    Right hip pain    Hypertrophy of prostate without urinary obstruction and other lower urinary tract symptoms (LUTS)    Encounter for long-term (current) use of insulin    History of DVT in adulthood    Class 2 severe obesity due to excess calories with serious comorbidity and body mass index (BMI) of 36.0 to 36.9 in adult    Seasonal allergies    Peripheral edema    Mixed hyperlipidemia    Prediabetes    Neuropathy    Basal cell carcinoma (BCC) of right medial cheek    Degeneration of lumbar intervertebral disc    Greater trochanteric pain syndrome    History of colonic polyps    Lumbar spondylosis     "Osteoarthritis of knee    Spinal stenosis of lumbar region     Advance Care Planning   Advance Care Planning     Advance Directive is not on file.  ACP discussion was held with the patient during this visit. Patient does not have an advance directive, information provided.     Objective    Vitals:    23 0941   BP: 160/84   BP Location: Left arm   Patient Position: Sitting   Cuff Size: Large Adult   Pulse: 72   Resp: 16   Temp: 98.4 øF (36.9 øC)   TempSrc: Tympanic   SpO2: 96%   Weight: 114 kg (252 lb)   Height: 177.8 cm (70\")   PainSc:   4   PainLoc: Hip  Comment: right     Estimated body mass index is 36.16 kg/mý as calculated from the following:    Height as of this encounter: 177.8 cm (70\").    Weight as of this encounter: 114 kg (252 lb).    Class 2 Severe Obesity (BMI >=35 and <=39.9). Obesity-related health conditions include the following: hypertension. Obesity is unchanged. BMI is is above average; BMI management plan is completed. We discussed portion control and increasing exercise.      Does the patient have evidence of cognitive impairment? No          HEALTH RISK ASSESSMENT    Smoking Status:  Social History     Tobacco Use   Smoking Status Never   Smokeless Tobacco Never     Alcohol Consumption:  Social History     Substance and Sexual Activity   Alcohol Use No     Fall Risk Screen:    STEADI Fall Risk Assessment was completed, and patient is at MODERATE risk for falls. Assessment completed on:2023    Depression Screenin/25/2023     9:53 AM   PHQ-2/PHQ-9 Depression Screening   Little Interest or Pleasure in Doing Things 0-->not at all   Feeling Down, Depressed or Hopeless 0-->not at all   PHQ-9: Brief Depression Severity Measure Score 0       Health Habits and Functional and Cognitive Screenin/25/2023     9:00 AM   Functional & Cognitive Status   Do you have difficulty preparing food and eating? Yes   Do you have difficulty bathing yourself, getting dressed or grooming " yourself? No   Do you have difficulty using the toilet? No   Do you have difficulty moving around from place to place? No   Do you have trouble with steps or getting out of a bed or a chair? No   Current Diet Well Balanced Diet   Dental Exam Up to date   Eye Exam Not up to date   Exercise (times per week) 7 times per week   Current Exercises Include Walking   Do you need help using the phone?  No   Are you deaf or do you have serious difficulty hearing?  No   Do you need help to go to places out of walking distance? No   Do you need help shopping? No   Do you need help preparing meals?  No   Do you need help with housework?  No   Do you need help with laundry? No   Do you need help taking your medications? No   Do you need help managing money? No   Do you ever drive or ride in a car without wearing a seat belt? No   Have you felt unusual stress, anger or loneliness in the last month? No   Who do you live with? Spouse   If you need help, do you have trouble finding someone available to you? No   Have you been bothered in the last four weeks by sexual problems? No   Do you have difficulty concentrating, remembering or making decisions? No       Age-appropriate Screening Schedule:  Refer to the list below for future screening recommendations based on patient's age, sex and/or medical conditions. Orders for these recommended tests are listed in the plan section. The patient has been provided with a written plan.    Health Maintenance   Topic Date Due    ZOSTER VACCINE (1 of 2) Never done    TDAP/TD VACCINES (2 - Tdap) 07/08/2019    Pneumococcal Vaccine 65+ (1 - PCV) Never done    COVID-19 Vaccine (3 - Pfizer series) 05/18/2021    INFLUENZA VACCINE  10/01/2023    LIPID PANEL  11/07/2023    ANNUAL WELLNESS VISIT  08/25/2024    COLORECTAL CANCER SCREENING  04/23/2031    HEPATITIS C SCREENING  Completed                  CMS Preventative Services Quick Reference  Risk Factors Identified During Encounter  Chronic Pain:  Natural history and expected course discussed. Questions answered.  Polypharmacy: Medication List reviewed  The above risks/problems have been discussed with the patient.  Pertinent information has been shared with the patient in the After Visit Summary.  An After Visit Summary and PPPS were made available to the patient.    Follow Up:   Next Medicare Wellness visit to be scheduled in 1 year.       Additional E&M Note during same encounter follows:  Patient has multiple medical problems which are significant and separately identifiable that require additional work above and beyond the Medicare Wellness Visit.      Chief Complaint  Medicare Wellness-Initial Visit    Subjective        CC: Hypertension, seasonal allergies    Hypertension  This is a chronic problem. The problem has been waxing and waning since onset. The problem is uncontrolled. Associated symptoms include peripheral edema. Pertinent negatives include no chest pain, headaches, palpitations or shortness of breath. Risk factors for coronary artery disease include family history, obesity, male gender and sedentary lifestyle. Current antihypertension treatment includes diuretics. The current treatment provides significant improvement. Compliance problems include exercise and diet.  There is no history of angina, kidney disease or CAD/MI.   Josh Pérez is also being seen today for HTN, seasonal allergies      Review of Systems   Constitutional:  Negative for activity change, appetite change, chills, diaphoresis, fatigue and fever.   Respiratory:  Negative for apnea, cough, choking, chest tightness, shortness of breath, wheezing and stridor.    Cardiovascular:  Positive for leg swelling. Negative for chest pain and palpitations.   Allergic/Immunologic: Positive for environmental allergies (reports not well controlled with otc claritin).     Objective   Vital Signs:  /84 (BP Location: Left arm, Patient Position: Sitting, Cuff Size: Large Adult)    "Pulse 72   Temp 98.4 øF (36.9 øC) (Tympanic)   Resp 16   Ht 177.8 cm (70\")   Wt 114 kg (252 lb)   SpO2 96%   BMI 36.16 kg/mý     Physical Exam  Vitals and nursing note reviewed.   Constitutional:       General: He is not in acute distress.     Appearance: Normal appearance. He is well-developed. He is obese. He is not ill-appearing, toxic-appearing or diaphoretic.   HENT:      Head: Normocephalic and atraumatic.   Eyes:      Conjunctiva/sclera: Conjunctivae normal.   Cardiovascular:      Rate and Rhythm: Normal rate and regular rhythm.      Heart sounds: Normal heart sounds. No murmur heard.    No friction rub. No gallop.   Pulmonary:      Effort: Pulmonary effort is normal. No respiratory distress.      Breath sounds: Normal breath sounds. No stridor. No wheezing, rhonchi or rales.      Comments: Lungs clear    Abdominal:      General: Bowel sounds are normal. There is no distension.      Palpations: Abdomen is soft. There is no mass.      Tenderness: There is no abdominal tenderness. There is no guarding or rebound.      Hernia: No hernia is present.   Musculoskeletal:         General: No tenderness. Normal range of motion.      Cervical back: Normal range of motion.      Right lower le+ Pitting      Left lower le+ Pitting   Skin:     General: Skin is warm and dry.      Coloration: Skin is not pale.      Findings: No erythema or rash.   Neurological:      Mental Status: He is alert and oriented to person, place, and time.   Psychiatric:         Mood and Affect: Mood normal.         Behavior: Behavior normal.         Thought Content: Thought content normal.         Judgment: Judgment normal.                       Assessment and Plan   Diagnoses and all orders for this visit:    1. Medicare annual wellness visit, subsequent (Primary)    2. Other specified inflammatory spondylopathies, lumbar region   -Patient had back surgery earlier this year.  Symptoms stable.  Follow-up with neurosurgery as " scheduled.    3. Chronic deep vein thrombosis (DVT) of popliteal vein of left lower extremity   -Stable.  Continue Eliquis and aspirin as prescribed.  Follow-up with cardiovascular surgery as scheduled.    4. Class 2 severe obesity due to excess calories with serious comorbidity and body mass index (BMI) of 36.0 to 36.9 in adult   -Stable.  Recommend low-carb low-fat diet and routine exercise.  We will continue to monitor.    5. Prediabetes  -     Hemoglobin A1c; Future  -     Basic Metabolic Panel; Future  -     Lipid Panel; Future we will call with results.  Continue low-carb diet.  We will continue to monitor.    6. Mixed hyperlipidemia  -     Hemoglobin A1c; Future  -     Basic Metabolic Panel; Future  -     Lipid Panel; Future  -     TSH; Future, will call with results.  Continue low-fat diet Lipitor as prescribed.  We will continue to monitor.    7. Benign essential HTN  -     hydroCHLOROthiazide (HYDRODIURIL) 25 MG tablet; Take 1 tablet by mouth Daily.  Dispense: 90 tablet; Refill: 3  -     CBC & Differential; Future   -Blood pressure mildly elevated.  Patient also reports chronic mild edema.  We will call with lab results.  HCTZ refilled and dosage increased to 25 mg daily.  Low-sodium diet.  We will continue to monitor.    8. Erectile dysfunction, unspecified erectile dysfunction type  -     Stable/controlled.  Refill, sildenafil (Viagra) 100 MG tablet; Take 1 tablet by mouth As Needed for Erectile Dysfunction.  Dispense: 30 tablet; Refill: 3    9. Prostate cancer screening  -     PSA Screen; Future, will call with results.    10. Seasonal allergies  -     levocetirizine (XYZAL) 5 MG tablet; Take 1 tablet by mouth Every Evening.  Dispense: 90 tablet; Refill: 3   -Patient reports poor symptom control with OTC Claritin.  We will discontinue Claritin and transition to Xyzal 5 mg daily as needed.  Avoid allergens.  We will continue to monitor.    11.  Follow-up in 6 months or sooner for any acute needs.            I spent 30 minutes caring for Josh on this date of service. This time includes time spent by me in the following activities:preparing for the visit, reviewing tests, performing a medically appropriate examination and/or evaluation , counseling and educating the patient/family/caregiver, ordering medications, tests, or procedures, and documenting information in the medical record  Follow Up   Return in about 6 months (around 2/25/2024), or if symptoms worsen or fail to improve, for Recheck.  Patient was given instructions and counseling regarding his condition or for health maintenance advice. Please see specific information pulled into the AVS if appropriate.               This document has been electronically signed by HELEN Rosenbaum on August 25, 2023 12:52 CDT

## 2023-08-25 NOTE — PATIENT INSTRUCTIONS
Medicare Wellness  Personal Prevention Plan of Service     Date of Office Visit:    Encounter Provider:  HELEN Rosenbaum  Place of Service:  [facility] [place]  Patient Name: Josh Pérez  :  1955    As part of the Medicare Wellness portion of your visit today, we are providing you with this personalized preventive plan of services (PPPS). This plan is based upon recommendations of the [place] Preventive Services Task Force (USPSTF) and the Advisory Committee on Immunization Practices (ACIP).    This lists the preventive care services that should be considered, and provides dates of when you are due. Items listed as completed are up-to-date and do not require any further intervention.    Health Maintenance   Topic Date Due    ZOSTER VACCINE (1 of 2) Never done    TDAP/TD VACCINES (2 - Tdap) 2019    Pneumococcal Vaccine 65+ (1 - PCV) Never done    COVID-19 Vaccine (3 - Pfizer series) 2021    INFLUENZA VACCINE  10/01/2023    LIPID PANEL  2023    ANNUAL WELLNESS VISIT  2024    COLORECTAL CANCER SCREENING  2031    HEPATITIS C SCREENING  Completed       Orders Placed This Encounter   Procedures    Hemoglobin A1c     Standing Status:   Future     Standing Expiration Date:   2024     Order Specific Question:   Release to patient     Answer:   Routine Release [[id number]]    Basic Metabolic Panel     Standing Status:   Future     Standing Expiration Date:   2024     Order Specific Question:   Release to patient     Answer:   Routine Release [[id number]]    Lipid Panel     Standing Status:   Future     Standing Expiration Date:   2024     Order Specific Question:   Release to patient     Answer:   Routine Release [[id number]]       No follow-ups on file.

## 2023-08-29 ENCOUNTER — TELEPHONE (OUTPATIENT)
Dept: FAMILY MEDICINE CLINIC | Facility: CLINIC | Age: 68
End: 2023-08-29
Payer: MEDICARE

## 2023-08-29 RX ORDER — FLUTICASONE PROPIONATE 50 MCG
2 SPRAY, SUSPENSION (ML) NASAL DAILY
Qty: 18.2 ML | Refills: 3 | Status: SHIPPED | OUTPATIENT
Start: 2023-08-29 | End: 2023-09-01 | Stop reason: SDUPTHER

## 2023-08-29 NOTE — TELEPHONE ENCOUNTER
Josh was thinking that [facility] was going to call in a nasal spray for him and when they picked up the medication there was not nasal spray in there.     Please call Josh back @ [phone]

## 2023-08-31 ENCOUNTER — LAB (OUTPATIENT)
Dept: LAB | Facility: HOSPITAL | Age: 68
End: 2023-08-31
Payer: MEDICARE

## 2023-08-31 DIAGNOSIS — Z12.5 PROSTATE CANCER SCREENING: ICD-10-CM

## 2023-08-31 DIAGNOSIS — E78.2 MIXED HYPERLIPIDEMIA: ICD-10-CM

## 2023-08-31 DIAGNOSIS — I10 BENIGN ESSENTIAL HTN: ICD-10-CM

## 2023-08-31 DIAGNOSIS — R73.03 PREDIABETES: ICD-10-CM

## 2023-08-31 LAB
ANION GAP SERPL CALCULATED.3IONS-SCNC: 9 MMOL/L (ref 5–15)
BUN SERPL-MCNC: 22 MG/DL (ref 8–23)
BUN/CREAT SERPL: 23.2 (ref 7–25)
CALCIUM SPEC-SCNC: 9.6 MG/DL (ref 8.6–10.5)
CHLORIDE SERPL-SCNC: 103 MMOL/L (ref 98–107)
CO2 SERPL-SCNC: 28 MMOL/L (ref 22–29)
CREAT SERPL-MCNC: 0.95 MG/DL (ref 0.76–1.27)
EGFRCR SERPLBLD CKD-EPI 2021: 87.2 ML/MIN/1.73
GLUCOSE SERPL-MCNC: 95 MG/DL (ref 65–99)
POTASSIUM SERPL-SCNC: 4 MMOL/L (ref 3.5–5.2)
SODIUM SERPL-SCNC: 140 MMOL/L (ref 136–145)

## 2023-08-31 PROCEDURE — 80048 BASIC METABOLIC PNL TOTAL CA: CPT

## 2023-08-31 PROCEDURE — 83036 HEMOGLOBIN GLYCOSYLATED A1C: CPT

## 2023-08-31 PROCEDURE — 80061 LIPID PANEL: CPT

## 2023-08-31 PROCEDURE — G0103 PSA SCREENING: HCPCS

## 2023-08-31 PROCEDURE — 85025 COMPLETE CBC W/AUTO DIFF WBC: CPT

## 2023-08-31 PROCEDURE — 84443 ASSAY THYROID STIM HORMONE: CPT

## 2023-08-31 PROCEDURE — 36415 COLL VENOUS BLD VENIPUNCTURE: CPT

## 2023-09-01 DIAGNOSIS — R73.03 PREDIABETES: Primary | ICD-10-CM

## 2023-09-01 LAB
BASOPHILS # BLD AUTO: 0.04 10*3/MM3 (ref 0–0.2)
BASOPHILS NFR BLD AUTO: 0.6 % (ref 0–1.5)
CHOLEST SERPL-MCNC: 172 MG/DL (ref 0–200)
DEPRECATED RDW RBC AUTO: 49.5 FL (ref 37–54)
EOSINOPHIL # BLD AUTO: 0.11 10*3/MM3 (ref 0–0.4)
EOSINOPHIL NFR BLD AUTO: 1.6 % (ref 0.3–6.2)
ERYTHROCYTE [DISTWIDTH] IN BLOOD BY AUTOMATED COUNT: 15.1 % (ref 12.3–15.4)
HBA1C MFR BLD: 6.3 % (ref 4.8–5.6)
HCT VFR BLD AUTO: 40.8 % (ref 37.5–51)
HDLC SERPL-MCNC: 40 MG/DL (ref 40–60)
HGB BLD-MCNC: 13.9 G/DL (ref 13–17.7)
IMM GRANULOCYTES # BLD AUTO: 0.02 10*3/MM3 (ref 0–0.05)
IMM GRANULOCYTES NFR BLD AUTO: 0.3 % (ref 0–0.5)
LDLC SERPL CALC-MCNC: 117 MG/DL (ref 0–100)
LDLC/HDLC SERPL: 2.92 {RATIO}
LYMPHOCYTES # BLD AUTO: 2.33 10*3/MM3 (ref 0.7–3.1)
LYMPHOCYTES NFR BLD AUTO: 32.9 % (ref 19.6–45.3)
MCH RBC QN AUTO: 30.6 PG (ref 26.6–33)
MCHC RBC AUTO-ENTMCNC: 34.1 G/DL (ref 31.5–35.7)
MCV RBC AUTO: 89.9 FL (ref 79–97)
MONOCYTES # BLD AUTO: 0.57 10*3/MM3 (ref 0.1–0.9)
MONOCYTES NFR BLD AUTO: 8 % (ref 5–12)
NEUTROPHILS NFR BLD AUTO: 4.02 10*3/MM3 (ref 1.7–7)
NEUTROPHILS NFR BLD AUTO: 56.6 % (ref 42.7–76)
NRBC BLD AUTO-RTO: 0 /100 WBC (ref 0–0.2)
PLATELET # BLD AUTO: 318 10*3/MM3 (ref 140–450)
PMV BLD AUTO: 10.1 FL (ref 6–12)
PSA SERPL-MCNC: 0.58 NG/ML (ref 0–4)
RBC # BLD AUTO: 4.54 10*6/MM3 (ref 4.14–5.8)
TRIGL SERPL-MCNC: 77 MG/DL (ref 0–150)
TSH SERPL DL<=0.05 MIU/L-ACNC: 2.75 UIU/ML (ref 0.27–4.2)
VLDLC SERPL-MCNC: 15 MG/DL (ref 5–40)
WBC NRBC COR # BLD: 7.09 10*3/MM3 (ref 3.4–10.8)

## 2023-09-01 RX ORDER — FLUTICASONE PROPIONATE 50 MCG
2 SPRAY, SUSPENSION (ML) NASAL DAILY
Qty: 18.2 ML | Refills: 3 | Status: SHIPPED | OUTPATIENT
Start: 2023-09-01

## 2023-09-01 RX ORDER — METFORMIN HYDROCHLORIDE 500 MG/1
500 TABLET, EXTENDED RELEASE ORAL
Qty: 90 TABLET | Refills: 3 | Status: SHIPPED | OUTPATIENT
Start: 2023-09-01

## 2023-09-01 RX ORDER — ATORVASTATIN CALCIUM 20 MG/1
20 TABLET, FILM COATED ORAL NIGHTLY
Qty: 90 TABLET | Refills: 2 | Status: SHIPPED | OUTPATIENT
Start: 2023-09-01

## 2023-09-01 NOTE — PROGRESS NOTES
LDL increased to 117.  Can you ask if he is taking his Lipitor as prescribed as reviewing the chart it is not clear if he is still taking or needs a refill.  Continue Lipitor and strict low-fat diet.  Hemoglobin A1c remains elevated at 6.3 and has not decreased through dietary efforts.  I am going to start him on metformin 500 mg daily with breakfast.  Take with food.  Low-carb diet and routine exercise strongly recommended.  Follow-up as scheduled.

## 2023-09-06 ENCOUNTER — TRANSCRIBE ORDERS (OUTPATIENT)
Dept: PHYSICAL THERAPY | Facility: HOSPITAL | Age: 68
End: 2023-09-06
Payer: MEDICARE

## 2023-09-06 DIAGNOSIS — Z98.1 S/P LUMBAR SPINAL FUSION: Primary | ICD-10-CM

## 2024-01-12 NOTE — DISCHARGE INSTRUCTIONS
I spoke with the pt letting him know that he can now just  the Tylenol over the counter     Pt understood and all questions were answered   Outpatient Instructions for Monitored Anesthesia Care (MAC)    1. You will be released from the hospital in the care of a responsible adult who should remain with you for at least 6 hours.    2. You are at an increased risk for falls following anesthesia. Use care when changing from a lying to a sitting position. Use your assistive devices ( example: cane, walker or family member).    3. You must NOT drive a car, climb high places such as a ladder, or operate equipment such as electric knives,stoves etc...for at least 12 hours. If you are dizzy for longer than 24 hours, notify your doctor.    4. DO NOT drink any alcoholic beverages for at least 24 hours. Anesthesia may impair your judgement.    5. If you smoke, do not smoke alone due to increased risk of burns/fires.    6. DO NOT undertake any legally binding commitment for at least 24 hours. Anesthesia may impair your judgement.

## (undated) DEVICE — SINGLE-USE BIOPSY FORCEPS: Brand: RADIAL JAW 4

## (undated) DEVICE — CANN SMPL SOFTECH BIFLO ETCO2 A/M 7FT

## (undated) DEVICE — Device: Brand: DISPOSABLE ELECTROSURGICAL SNARE

## (undated) DEVICE — TRAP SXN POLYP QUICKCATCH LF